# Patient Record
Sex: FEMALE | Race: WHITE | NOT HISPANIC OR LATINO | Employment: OTHER | URBAN - METROPOLITAN AREA
[De-identification: names, ages, dates, MRNs, and addresses within clinical notes are randomized per-mention and may not be internally consistent; named-entity substitution may affect disease eponyms.]

---

## 2023-11-09 PROCEDURE — 99213 OFFICE O/P EST LOW 20 MIN: CPT | Performed by: INTERNAL MEDICINE

## 2023-11-15 DIAGNOSIS — I65.23 CAROTID STENOSIS, BILATERAL: ICD-10-CM

## 2023-11-15 PROBLEM — R53.83 FATIGUE: Status: ACTIVE | Noted: 2023-11-15

## 2023-11-15 PROBLEM — R79.89 ABNORMAL THYROID BLOOD TEST: Status: ACTIVE | Noted: 2023-11-15

## 2023-11-15 PROBLEM — R25.1 TREMOR: Status: ACTIVE | Noted: 2023-11-15

## 2023-11-15 PROBLEM — R26.89 BALANCE PROBLEM: Status: ACTIVE | Noted: 2023-11-15

## 2023-11-15 PROBLEM — E53.8 VITAMIN B12 DEFICIENCY: Status: ACTIVE | Noted: 2023-11-15

## 2023-11-15 PROBLEM — R73.9 HYPERGLYCEMIA: Status: ACTIVE | Noted: 2023-11-15

## 2023-11-15 PROBLEM — R49.0 HOARSENESS, CHRONIC: Status: ACTIVE | Noted: 2023-11-15

## 2023-11-15 PROBLEM — I65.8 OCCLUSION AND STENOSIS OF OTHER PRECEREBRAL ARTERIES: Status: ACTIVE | Noted: 2023-11-15

## 2023-11-15 PROBLEM — M62.81 MUSCLE WEAKNESS: Status: ACTIVE | Noted: 2023-11-15

## 2023-11-15 PROBLEM — I63.9 CVA (CEREBRAL VASCULAR ACCIDENT) (MULTI): Status: ACTIVE | Noted: 2023-11-15

## 2023-11-15 PROBLEM — I10 HYPERTENSION: Status: ACTIVE | Noted: 2023-11-15

## 2023-11-15 PROBLEM — I26.99 PULMONARY EMBOLISM (MULTI): Status: ACTIVE | Noted: 2023-11-15

## 2023-11-15 PROBLEM — E55.9 VITAMIN D DEFICIENCY: Status: ACTIVE | Noted: 2023-11-15

## 2023-11-15 PROBLEM — I81 PORTAL VEIN THROMBOSIS: Status: ACTIVE | Noted: 2023-11-15

## 2023-11-15 PROBLEM — R20.0 NUMBNESS OF RIGHT HAND: Status: ACTIVE | Noted: 2023-11-15

## 2023-11-15 RX ORDER — LOSARTAN POTASSIUM 25 MG/1
1 TABLET ORAL DAILY
COMMUNITY
Start: 2021-12-10 | End: 2024-01-26

## 2023-11-15 RX ORDER — ASPIRIN 81 MG/1
1 TABLET ORAL DAILY
COMMUNITY

## 2023-11-15 RX ORDER — ATORVASTATIN CALCIUM 10 MG/1
1 TABLET, FILM COATED ORAL NIGHTLY
COMMUNITY
Start: 2022-10-04 | End: 2023-11-15 | Stop reason: SDUPTHER

## 2023-11-15 RX ORDER — ATORVASTATIN CALCIUM 10 MG/1
10 TABLET, FILM COATED ORAL NIGHTLY
Qty: 90 TABLET | Refills: 3 | Status: SHIPPED | OUTPATIENT
Start: 2023-11-15 | End: 2023-12-12 | Stop reason: SDUPTHER

## 2023-11-15 RX ORDER — AMLODIPINE BESYLATE 2.5 MG/1
1 TABLET ORAL DAILY
COMMUNITY
Start: 2022-11-18 | End: 2024-02-12 | Stop reason: WASHOUT

## 2023-11-15 RX ORDER — MULTIVIT-MIN/FA/LYCOPEN/LUTEIN .4-300-25
1 TABLET ORAL DAILY
COMMUNITY

## 2023-11-25 DIAGNOSIS — I26.99 OTHER PULMONARY EMBOLISM WITHOUT ACUTE COR PULMONALE, UNSPECIFIED CHRONICITY (MULTI): Primary | ICD-10-CM

## 2023-11-27 RX ORDER — RIVAROXABAN 20 MG/1
20 TABLET, FILM COATED ORAL DAILY
Qty: 100 TABLET | Refills: 0 | Status: SHIPPED | OUTPATIENT
Start: 2023-11-27 | End: 2024-02-06

## 2023-12-12 DIAGNOSIS — I65.23 CAROTID STENOSIS, BILATERAL: ICD-10-CM

## 2023-12-12 RX ORDER — ATORVASTATIN CALCIUM 10 MG/1
10 TABLET, FILM COATED ORAL NIGHTLY
Qty: 90 TABLET | Refills: 3 | Status: SHIPPED | OUTPATIENT
Start: 2023-12-12 | End: 2023-12-18 | Stop reason: SDUPTHER

## 2023-12-13 ENCOUNTER — APPOINTMENT (OUTPATIENT)
Dept: PRIMARY CARE | Facility: CLINIC | Age: 88
End: 2023-12-13
Payer: COMMERCIAL

## 2023-12-15 ENCOUNTER — OFFICE VISIT (OUTPATIENT)
Dept: PRIMARY CARE | Facility: CLINIC | Age: 88
End: 2023-12-15
Payer: COMMERCIAL

## 2023-12-15 VITALS
HEIGHT: 67 IN | DIASTOLIC BLOOD PRESSURE: 78 MMHG | BODY MASS INDEX: 29.35 KG/M2 | SYSTOLIC BLOOD PRESSURE: 110 MMHG | OXYGEN SATURATION: 96 % | TEMPERATURE: 98 F | WEIGHT: 187 LBS | HEART RATE: 84 BPM

## 2023-12-15 DIAGNOSIS — I10 HYPERTENSION, UNSPECIFIED TYPE: ICD-10-CM

## 2023-12-15 DIAGNOSIS — R73.9 HYPERGLYCEMIA: ICD-10-CM

## 2023-12-15 DIAGNOSIS — E55.9 VITAMIN D DEFICIENCY: Primary | ICD-10-CM

## 2023-12-15 DIAGNOSIS — E61.1 LOW IRON: ICD-10-CM

## 2023-12-15 PROBLEM — R49.0 HOARSENESS, CHRONIC: Status: RESOLVED | Noted: 2023-11-15 | Resolved: 2023-12-15

## 2023-12-15 PROBLEM — I65.23 CAROTID STENOSIS, ASYMPTOMATIC, BILATERAL: Status: ACTIVE | Noted: 2023-12-15

## 2023-12-15 PROCEDURE — 1036F TOBACCO NON-USER: CPT | Performed by: INTERNAL MEDICINE

## 2023-12-15 PROCEDURE — 1126F AMNT PAIN NOTED NONE PRSNT: CPT | Performed by: INTERNAL MEDICINE

## 2023-12-15 PROCEDURE — 99214 OFFICE O/P EST MOD 30 MIN: CPT | Performed by: INTERNAL MEDICINE

## 2023-12-15 PROCEDURE — 1159F MED LIST DOCD IN RCRD: CPT | Performed by: INTERNAL MEDICINE

## 2023-12-15 PROCEDURE — 3078F DIAST BP <80 MM HG: CPT | Performed by: INTERNAL MEDICINE

## 2023-12-15 PROCEDURE — 3074F SYST BP LT 130 MM HG: CPT | Performed by: INTERNAL MEDICINE

## 2023-12-15 RX ORDER — NYSTATIN 100000 U/G
CREAM TOPICAL
COMMUNITY

## 2023-12-15 RX ORDER — LANOLIN ALCOHOL/MO/W.PET/CERES
1 CREAM (GRAM) TOPICAL DAILY
COMMUNITY

## 2023-12-15 ASSESSMENT — PAIN SCALES - GENERAL: PAINLEVEL: 0-NO PAIN

## 2023-12-15 ASSESSMENT — PATIENT HEALTH QUESTIONNAIRE - PHQ9
2. FEELING DOWN, DEPRESSED OR HOPELESS: NOT AT ALL
SUM OF ALL RESPONSES TO PHQ9 QUESTIONS 1 & 2: 0
1. LITTLE INTEREST OR PLEASURE IN DOING THINGS: NOT AT ALL

## 2023-12-15 NOTE — PROGRESS NOTES
"Chief Complaint   Patient presents with    Med Refill     Pt here for medication refills.     Last 6/2023. Fatigue and sob.  Friend present.   Would like to decrease visits to Dr Gonzalez.   No chest pain/pressure, sob  Fatigue, has to rest when walking in the store. Does not to wear oxygen. Told to use prn per patient.  Uses can of oxygen that she sprays prn sob. This helps    PAST MEDICAL HISTORY:   1. Pulmonary embolism, on Xarelto since December 2017, Dr. Gonzalez.  2. Portal vein thrombosis, Xarelto.  3. Pancreatitis, likely reaction to portal vein thrombosis.  4. Hypertension.  5. Right ovarian cyst.   6. Shortness of breath, hypoxia. PFTs Dr. Chapman. Oxygen 2 L when necessary  7. Carotid stenosis 8/2022 US 50-69%  8. Hyperglycemia  9. CVA no residual    SOCIAL HISTORY: . Ex- passed away. She has 5  children. They are healthy. She is a nonsmoker.    FAMILY HISTORY: Sister and father had cancer, but type unknown  sister with blood condition, but unsure what it was.     Blood pressure 110/78, pulse 84, temperature 36.7 °C (98 °F), height 1.702 m (5' 7\"), weight 84.8 kg (187 lb), SpO2 96 %.  Body mass index is 29.29 kg/m².    Vital reviewed  Neck: no cervical/clavicular adenopathy  CV: RRR S1 S2 normal. No murmur  Lungs: CTA without wrr. Breath sounds symmetric  Abdomen: normoactive. Soft, nontender. No mass.  Extremities: no pretibial edema  Neuro: speech intact.     Labs 11/2023 cmp, cbc  Cr 1.2 albumin low 3.2 glucose 113  Hg 11.9    A/p  HTNcontrolled  Hyperlipdemia continue atorvastatin  Fatigue D with next labs. Previously checked d, b12, thyroid studies  Hx portal vein thrombosis. Reviewed hem/onc note, continue xarelto lifelong. She requested to stop visits but we discussed changing to yearly  CKD stable.   Labs 6 months and prn.          "

## 2023-12-18 DIAGNOSIS — I65.23 CAROTID STENOSIS, BILATERAL: ICD-10-CM

## 2023-12-18 RX ORDER — ATORVASTATIN CALCIUM 10 MG/1
10 TABLET, FILM COATED ORAL NIGHTLY
Qty: 90 TABLET | Refills: 1 | Status: SHIPPED | OUTPATIENT
Start: 2023-12-18 | End: 2024-03-25 | Stop reason: SDUPTHER

## 2024-01-24 DIAGNOSIS — I10 HYPERTENSION, UNSPECIFIED TYPE: ICD-10-CM

## 2024-01-26 RX ORDER — LOSARTAN POTASSIUM 25 MG/1
25 TABLET ORAL DAILY
Qty: 100 TABLET | Refills: 2 | Status: SHIPPED | OUTPATIENT
Start: 2024-01-26

## 2024-02-03 DIAGNOSIS — I26.99 OTHER PULMONARY EMBOLISM WITHOUT ACUTE COR PULMONALE, UNSPECIFIED CHRONICITY (MULTI): ICD-10-CM

## 2024-02-06 RX ORDER — RIVAROXABAN 20 MG/1
20 TABLET, FILM COATED ORAL DAILY
Qty: 100 TABLET | Refills: 1 | Status: SHIPPED | OUTPATIENT
Start: 2024-02-06

## 2024-02-12 ENCOUNTER — OFFICE VISIT (OUTPATIENT)
Dept: PRIMARY CARE | Facility: CLINIC | Age: 89
End: 2024-02-12
Payer: COMMERCIAL

## 2024-02-12 VITALS
DIASTOLIC BLOOD PRESSURE: 80 MMHG | OXYGEN SATURATION: 92 % | SYSTOLIC BLOOD PRESSURE: 138 MMHG | TEMPERATURE: 97.6 F | HEIGHT: 67 IN | WEIGHT: 190.6 LBS | HEART RATE: 68 BPM | BODY MASS INDEX: 29.91 KG/M2

## 2024-02-12 DIAGNOSIS — N93.9 ABNORMAL VAGINAL BLEEDING: ICD-10-CM

## 2024-02-12 DIAGNOSIS — I26.99 PULMONARY EMBOLISM, UNSPECIFIED CHRONICITY, UNSPECIFIED PULMONARY EMBOLISM TYPE, UNSPECIFIED WHETHER ACUTE COR PULMONALE PRESENT (MULTI): ICD-10-CM

## 2024-02-12 DIAGNOSIS — M54.40 ACUTE RIGHT-SIDED LOW BACK PAIN WITH SCIATICA, SCIATICA LATERALITY UNSPECIFIED: ICD-10-CM

## 2024-02-12 DIAGNOSIS — N83.209 CYST OF OVARY, UNSPECIFIED LATERALITY: Primary | ICD-10-CM

## 2024-02-12 PROCEDURE — 99215 OFFICE O/P EST HI 40 MIN: CPT | Performed by: INTERNAL MEDICINE

## 2024-02-12 PROCEDURE — 3079F DIAST BP 80-89 MM HG: CPT | Performed by: INTERNAL MEDICINE

## 2024-02-12 PROCEDURE — 1126F AMNT PAIN NOTED NONE PRSNT: CPT | Performed by: INTERNAL MEDICINE

## 2024-02-12 PROCEDURE — 1159F MED LIST DOCD IN RCRD: CPT | Performed by: INTERNAL MEDICINE

## 2024-02-12 PROCEDURE — 1036F TOBACCO NON-USER: CPT | Performed by: INTERNAL MEDICINE

## 2024-02-12 PROCEDURE — 3075F SYST BP GE 130 - 139MM HG: CPT | Performed by: INTERNAL MEDICINE

## 2024-02-12 RX ORDER — OXYCODONE HYDROCHLORIDE AND ACETAMINOPHEN 5; 325 MG/1; MG/1
1 TABLET ORAL EVERY 6 HOURS PRN
COMMUNITY
Start: 2024-02-09 | End: 2024-02-12

## 2024-02-12 RX ORDER — OXYCODONE AND ACETAMINOPHEN 5; 325 MG/1; MG/1
1 TABLET ORAL EVERY 6 HOURS PRN
Qty: 12 TABLET | Refills: 0 | Status: SHIPPED | OUTPATIENT
Start: 2024-02-12 | End: 2024-04-12 | Stop reason: ALTCHOICE

## 2024-02-12 RX ORDER — PREDNISONE 10 MG/1
TABLET ORAL
COMMUNITY
Start: 2024-02-09 | End: 2024-02-21

## 2024-02-12 RX ORDER — AMLODIPINE BESYLATE 5 MG/1
5 TABLET ORAL DAILY
COMMUNITY

## 2024-02-12 ASSESSMENT — PATIENT HEALTH QUESTIONNAIRE - PHQ9
1. LITTLE INTEREST OR PLEASURE IN DOING THINGS: NOT AT ALL
2. FEELING DOWN, DEPRESSED OR HOPELESS: NOT AT ALL
SUM OF ALL RESPONSES TO PHQ9 QUESTIONS 1 & 2: 0

## 2024-02-12 ASSESSMENT — PAIN SCALES - GENERAL: PAINLEVEL: 0-NO PAIN

## 2024-02-12 NOTE — PROGRESS NOTES
"Standard Progress Note  Chief Complaint   Patient presents with    Hospital Follow-up     Pt here for FUV for [facility] [place] [place] ER on 2/9/24.     Accompanied by friend  Right sided back pain  Has trouble lifting up the leg. Has to use a walker now.    Trouble with balance.  During  Er rx prednisone 10 mg and oxycodone 5-325 mg.  Fell 4 times this year.  2 weeks ago-ankle felt off and she fell  Another time was putting dishes in  and developed pain  No fever or chills.   Treated at . Per paperwork had ovarian cyst-do not have copy of CT report at this time so do no thave details  She did have vaginal bleeding after her last visit 12/17/23. It lasted 3 days.       HPI:  Cynthia Allredambika 89 y.o.   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 Ex- passed away. She has 5    children. They are healthy. She is a nonsmoker.     Blood pressure 138/80, pulse 68, temperature 36.4 °C (97.6 °F), height 1.702 m (5' 7\"), weight 86.5 kg (190 lb 9.6 oz), SpO2 92 %.  Body mass index is 29.85 kg/m².  walker  Vital reviewed  Neck: no cervical/clavicular adenopathy  CV: RRR S1 S2 normal. No murmur. No carotid bruit.   Lungs: CTA without wrr. Breath sounds symmetric  Extremities: no pretibial edema  Neuro: speech intact.     Labs 11/2023 cmp, cbc  Cr 1.2 albumin low 3.2 glucose 113  Hg 11.9    1. Cyst of ovary, unspecified laterality  - Referral to Gynecology; Future  - US pelvis; Future    2. Abnormal vaginal bleeding  - Referral to Gynecology; " Future    3. Acute right-sided low back pain with sciatica, sciatica laterality unspecified  Reviewed oars report  Obtain copy of ct done at -task to staff  Use percocet sparingly as can only write one short term rx. Referral to pain clinic  - oxyCODONE-acetaminophen (Percocet) 5-325 mg tablet; Take 1 tablet by mouth every 6 hours if needed for severe pain (7 - 10).  Dispense: 12 tablet; Refill: 0  - Referral to Pain Medicine; Future    4. Pulmonary embolism, unspecified chronicity, unspecified pulmonary embolism type, unspecified whether acute cor pulmonale present (CMS/[facility])  Remote history 2017. Previously evaluated by hem/onc      Current Outpatient Medications on File Prior to Visit   Medication Sig Dispense Refill    amLODIPine (Norvasc) 5 mg tablet Take 1 tablet (5 mg) by mouth once daily.      aspirin 81 mg EC tablet Take 1 tablet (81 mg) by mouth once daily.      atorvastatin (Lipitor) 10 mg tablet Take 1 tablet (10 mg) by mouth once daily at bedtime. 90 tablet 1    cyanocobalamin (Vitamin B-12) 1,000 mcg tablet Take 1 tablet (1,000 mcg) by mouth once daily.      losartan (Cozaar) 25 mg tablet TAKE 1 TABLET BY MOUTH DAILY 100 tablet 2    multivitamin with minerals iron-free (Centrum Silver) Take 1 tablet by mouth once daily.      nystatin (Mycostatin) cream Apply topically. APPLY SPARINGLY TO AFFECTED AREA(S) 3 TIMES A DAY      Percocet 5-325 mg tablet Take 1 tablet by mouth every 6 hours if needed.      predniSONE 10 mg tablets,dose pack Take 4 tabs daily for 3 days, then 3 tabs daily for 3 days, then 2 tabs daily for 3 days, then 1 tab daily for 3 days.,      rivaroxaban (Xarelto) 20 mg tablet TAKE 1 TABLET BY MOUTH DAILY 100 tablet 1    [DISCONTINUED] amLODIPine (Norvasc) 2.5 mg tablet Take 1 tablet (2.5 mg) by mouth once daily.      [DISCONTINUED] rivaroxaban (Xarelto) 20 mg tablet TAKE 1 TABLET BY MOUTH DAILY 100 tablet 0     No current facility-administered medications on file prior to visit.          Khadijah Suero MD

## 2024-02-14 ENCOUNTER — TELEPHONE (OUTPATIENT)
Dept: PRIMARY CARE | Facility: CLINIC | Age: 89
End: 2024-02-14
Payer: COMMERCIAL

## 2024-02-14 NOTE — TELEPHONE ENCOUNTER
----- Message from Khadijah Reddy MD sent at 2/12/2024  3:20 PM EST -----  Please call SW -need results of ct lumbar spine.

## 2024-02-15 ENCOUNTER — TELEPHONE (OUTPATIENT)
Dept: PRIMARY CARE | Facility: CLINIC | Age: 89
End: 2024-02-15
Payer: COMMERCIAL

## 2024-02-15 NOTE — TELEPHONE ENCOUNTER
Called pt and informed.  States she has an appt with gynecologist on 2/21/24, and the US is scheduled for 2/22/24

## 2024-02-15 NOTE — TELEPHONE ENCOUNTER
----- Message from Khadijah Reddy MD sent at 2/14/2024  5:30 PM EST -----  Please let patient know received copy of CT results from the hospital. Will plan on pelvic ultrasound and having her see a gynecologist as discussed at her visit. Please see if pelvic ultrasound has been scheduled yet.

## 2024-02-19 PROBLEM — N95.0 POST-MENOPAUSAL BLEEDING: Status: ACTIVE | Noted: 2024-02-19

## 2024-02-19 PROBLEM — N83.201 RIGHT OVARIAN CYST: Status: ACTIVE | Noted: 2024-02-19

## 2024-02-19 NOTE — PROGRESS NOTES
"Subjective   Patient ID: Cynthia Bowman is a 89 y.o. female who presents for No chief complaint on file..  Bleeding from -.  Not a great deal of bleeding.  The cyst finding was \"accidental\" for back pain.   Feeling very weak in her right leg due to sciatica. This had been going on for two weeks. Took herself off the medication (percocet).  Taking tylenol.   x 5.   No history of abn paps, but does not think she had any.  No partner now.      Review of Systems   Constitutional:  Positive for activity change. Negative for appetite change, fatigue, fever and unexpected weight change.   Genitourinary:  Positive for vaginal bleeding.       Objective   Physical Exam  Constitutional:       Appearance: Normal appearance. She is normal weight.   Genitourinary:     General: Normal vulva.      Vagina: Normal.      Cervix: Normal.      Uterus: Normal.           Comments: Urethral caruncle.  No palpable masses.  Neurological:      General: No focal deficit present.      Mental Status: She is alert.   Psychiatric:         Mood and Affect: Mood normal.         Behavior: Behavior normal.         Thought Content: Thought content normal.     Patient ID: Cynthia Bowman is a 89 y.o. female.    Endometrial biopsy    Date/Time: 2024 5:16 PM    Performed by: Tanika Oswald MD  Authorized by: Tanika Oswald MD    Consent:     Consent obtained: written    Consent given by: patient    Risks discussed: bleeding and pain    Alternatives discussed: observation    Patient agrees, verbalizes understanding, and wants to proceed: yes      Procedure explained and questions answered to patient or proxy's satisfaction: yes    Indications:     Indications: postmenopausal bleeding      Chronicity of post-menopausal bleeding: new    Progression of post-menopausal bleeding: resolved  Pre-procedure:     Urine pregnancy test: N/A    Procedure:     A bimanual exam was performed: yes      Uterus size: 6-8 weeks    Uterus position: " midposition    Prepped with: Betadine    Tenaculum used: yes      A local block was performed: no      Cervix dilated: no    Findings:     Cervix: stenotic      Patient tolerance: tolerated with difficulty  Comments:     Procedure comments: Unable to perform      Assessment/Plan   Problem List Items Addressed This Visit             ICD-10-CM       Genitourinary and Reproductive    Post-menopausal bleeding - Primary N95.0    Right ovarian cyst N83.201            Tanika Oswlad MD 02/19/24 1:05 PM

## 2024-02-20 NOTE — TELEPHONE ENCOUNTER
Left pt detailed message requesting name of gynecologist and provided office fax number for copy of visit note.

## 2024-02-21 ENCOUNTER — OFFICE VISIT (OUTPATIENT)
Dept: OBSTETRICS AND GYNECOLOGY | Facility: CLINIC | Age: 89
End: 2024-02-21
Payer: COMMERCIAL

## 2024-02-21 VITALS
DIASTOLIC BLOOD PRESSURE: 62 MMHG | BODY MASS INDEX: 29.82 KG/M2 | HEIGHT: 67 IN | WEIGHT: 190 LBS | SYSTOLIC BLOOD PRESSURE: 114 MMHG

## 2024-02-21 DIAGNOSIS — N83.209 CYST OF OVARY, UNSPECIFIED LATERALITY: ICD-10-CM

## 2024-02-21 DIAGNOSIS — Z12.4 CERVICAL CANCER SCREENING: ICD-10-CM

## 2024-02-21 DIAGNOSIS — N95.0 POST-MENOPAUSAL BLEEDING: Primary | ICD-10-CM

## 2024-02-21 DIAGNOSIS — N83.201 RIGHT OVARIAN CYST: ICD-10-CM

## 2024-02-21 DIAGNOSIS — N93.9 ABNORMAL VAGINAL BLEEDING: ICD-10-CM

## 2024-02-21 PROBLEM — N94.89 ADNEXAL MASS: Status: ACTIVE | Noted: 2024-02-21

## 2024-02-21 PROCEDURE — 1159F MED LIST DOCD IN RCRD: CPT | Performed by: OBSTETRICS & GYNECOLOGY

## 2024-02-21 PROCEDURE — 88175 CYTOPATH C/V AUTO FLUID REDO: CPT

## 2024-02-21 PROCEDURE — 58100 BIOPSY OF UTERUS LINING: CPT | Performed by: OBSTETRICS & GYNECOLOGY

## 2024-02-21 PROCEDURE — 99203 OFFICE O/P NEW LOW 30 MIN: CPT | Performed by: OBSTETRICS & GYNECOLOGY

## 2024-02-21 PROCEDURE — 1036F TOBACCO NON-USER: CPT | Performed by: OBSTETRICS & GYNECOLOGY

## 2024-02-21 PROCEDURE — 3074F SYST BP LT 130 MM HG: CPT | Performed by: OBSTETRICS & GYNECOLOGY

## 2024-02-21 PROCEDURE — 1126F AMNT PAIN NOTED NONE PRSNT: CPT | Performed by: OBSTETRICS & GYNECOLOGY

## 2024-02-21 PROCEDURE — 3078F DIAST BP <80 MM HG: CPT | Performed by: OBSTETRICS & GYNECOLOGY

## 2024-02-21 PROCEDURE — 87624 HPV HI-RISK TYP POOLED RSLT: CPT

## 2024-02-21 ASSESSMENT — ENCOUNTER SYMPTOMS
APPETITE CHANGE: 0
FEVER: 0
FATIGUE: 0
UNEXPECTED WEIGHT CHANGE: 0
ACTIVITY CHANGE: 1

## 2024-02-21 NOTE — ASSESSMENT & PLAN NOTE
Attempt at endometrial biopsy unsuccessful due to patient's discomfort and some cervical stenosis.  Will update after ultrasound.

## 2024-02-21 NOTE — PROGRESS NOTES
Patient ID: Cynthia Bowman is a 89 y.o. female.    Endometrial biopsy    Date/Time: 2/21/2024 10:59 AM    Performed by: Tanika Oswald MD  Authorized by: Tanika Oswald MD    Consent:     Consent obtained: written    Consent given by: patient  Universal protocol:     Test results available and properly labeled: yes      Relevant documents present and verified: yes      Imaging studies available: yes      Required blood products, implants, devices, and special equipment available: yes      Site/side marked: yes      Immediately prior to procedure a time out was called: yes      Patient identity confirmed: verbally with patient

## 2024-02-22 ENCOUNTER — HOSPITAL ENCOUNTER (OUTPATIENT)
Dept: RADIOLOGY | Facility: CLINIC | Age: 89
Discharge: HOME | End: 2024-02-22
Payer: COMMERCIAL

## 2024-02-22 DIAGNOSIS — N83.209 CYST OF OVARY, UNSPECIFIED LATERALITY: ICD-10-CM

## 2024-02-22 PROCEDURE — 76857 US EXAM PELVIC LIMITED: CPT | Performed by: RADIOLOGY

## 2024-02-22 PROCEDURE — 76830 TRANSVAGINAL US NON-OB: CPT | Performed by: RADIOLOGY

## 2024-02-22 PROCEDURE — 76856 US EXAM PELVIC COMPLETE: CPT

## 2024-03-01 ENCOUNTER — TELEPHONE (OUTPATIENT)
Dept: PRIMARY CARE | Facility: CLINIC | Age: 89
End: 2024-03-01
Payer: COMMERCIAL

## 2024-03-01 NOTE — TELEPHONE ENCOUNTER
----- Message from Khadijah Reddy MD sent at 2/26/2024  1:51 PM EST -----  Received message does not think ovarian cyst is cancerous. As a precaution, can repeat pelvic ultrasound in 3 months. If patient is ok with this route back and will order pelvic ultrasound for end of May.

## 2024-03-01 NOTE — TELEPHONE ENCOUNTER
Pt returned call and informed, voiced understanding.  Pt is agreeable to pelvic ultrasound in 3 months

## 2024-03-01 NOTE — TELEPHONE ENCOUNTER
----- Message from Khadijah Reddy MD sent at 2/26/2024  5:41 PM EST -----  There is another message regarding this. Patient saw ob/gyn already for this. Gyn thinks this is benign. Could do ultrasound in 3 months.

## 2024-03-04 ENCOUNTER — OFFICE VISIT (OUTPATIENT)
Dept: PAIN MEDICINE | Facility: CLINIC | Age: 89
End: 2024-03-04
Payer: COMMERCIAL

## 2024-03-04 VITALS
WEIGHT: 190 LBS | TEMPERATURE: 97.3 F | RESPIRATION RATE: 15 BRPM | HEIGHT: 67 IN | SYSTOLIC BLOOD PRESSURE: 127 MMHG | HEART RATE: 93 BPM | BODY MASS INDEX: 29.82 KG/M2 | DIASTOLIC BLOOD PRESSURE: 75 MMHG

## 2024-03-04 DIAGNOSIS — M54.40 ACUTE RIGHT-SIDED LOW BACK PAIN WITH SCIATICA, SCIATICA LATERALITY UNSPECIFIED: ICD-10-CM

## 2024-03-04 DIAGNOSIS — M54.16 LUMBAR NEURITIS: Primary | ICD-10-CM

## 2024-03-04 PROCEDURE — 99214 OFFICE O/P EST MOD 30 MIN: CPT | Performed by: ANESTHESIOLOGY

## 2024-03-04 ASSESSMENT — ENCOUNTER SYMPTOMS
LOSS OF SENSATION IN FEET: 1
OCCASIONAL FEELINGS OF UNSTEADINESS: 1
DEPRESSION: 0

## 2024-03-04 ASSESSMENT — PATIENT HEALTH QUESTIONNAIRE - PHQ9
2. FEELING DOWN, DEPRESSED OR HOPELESS: NOT AT ALL
1. LITTLE INTEREST OR PLEASURE IN DOING THINGS: NOT AT ALL
SUM OF ALL RESPONSES TO PHQ9 QUESTIONS 1 AND 2: 0

## 2024-03-04 ASSESSMENT — COLUMBIA-SUICIDE SEVERITY RATING SCALE - C-SSRS
2. HAVE YOU ACTUALLY HAD ANY THOUGHTS OF KILLING YOURSELF?: NO
6. HAVE YOU EVER DONE ANYTHING, STARTED TO DO ANYTHING, OR PREPARED TO DO ANYTHING TO END YOUR LIFE?: NO
1. IN THE PAST MONTH, HAVE YOU WISHED YOU WERE DEAD OR WISHED YOU COULD GO TO SLEEP AND NOT WAKE UP?: NO

## 2024-03-04 ASSESSMENT — PAIN SCALES - GENERAL: PAINLEVEL: 8

## 2024-03-04 NOTE — PROGRESS NOTES
History Of Present Illness  Cynthia Bowman is a 89 y.o. female presenting with   Chief Complaint   Patient presents with    Back Pain       Patient presents with complaints of chronic low back pain to the RLE.  The pain is constant, worse with activity and better with nothing. The pain is irritating sharp, stabbing and shooting to the RLE. Denies LE paresthesias, weakness, saddle anesthesia, bowel or bladder incontinence. To manage this pain the patient has attempted Percocet via the ED but it made her drowsy. The patients chronic hx of DVT and Pancreas and was started on XARELTO, HTN,  are stable on medication management.     PAIN SCORE: 8/10.    PCP: Dr. Reddy ()       Past Medical History  She has a past medical history of elevated lipids, Hypertension, and Pulmonary emboli (CMS/HCC).    Surgical History  She has a past surgical history that includes Other surgical history (12/08/2021).     Social History  She reports that she has never smoked. She has never used smokeless tobacco. She reports that she does not drink alcohol and does not use drugs.    Denies any tobacco usage.     Family History  Family History   Problem Relation Name Age of Onset    Cancer Father          type unknown    Cancer Sister          type unknown        Allergies  Patient has no known allergies.    Review of Systems    All other systems reviewed and negative for any deficits. Pertinent positives and negatives were considered in the medical decision making process.        Physical Exam  /75   Pulse 93   Temp 36.3 °C (97.3 °F)   Resp 15   Wt 86.2 kg (190 lb)     General: Pt appears stated age    Eyes: Conjunctiva non-icteric and lids without obvious rash or drooping. Pupils are symmetric    ENT: External ears and nose appear to be without deformity or rash. No lesions or masses noted. Hearing is grossly intact    Neck: No JVD noted, tracheal position midline. No goiter noted on assessment of thyroid    Respiratory: No  gasping or shortness of breath noted, no use of accessory muscles noted    Cardiovascular: Extremities show no edema or varicosities    Skin: No rashes or open lesions/ulcers identified on skin. No induration/tightening noted with palpation of skin    Musculoskeletal: Gait is grossly normal    Digits/nails show no clubbing or cyanosis    Exam of muscles/joints/bones shows no gross atrophy and no abnormal/involuntary movements in the head/neckNo asymmetry or masses noted in the head/neck    Stability: no subluxation noted on movement of bilateral upper extremities or head/neck    Strength: 4/5 in RLE and 5/5 LLE     Range of Motion: WNL     Neurologic: Reflexes: 2+     Sensation: WNL    Cranial nerves 2-12 are grossly intact    Psychiatric: Pt is alert and oriented to time, place and person.         Assessment/Plan   1. Lumbar neuritis  Referral to Physical Therapy      2. Acute right-sided low back pain with sciatica, sciatica laterality unspecified  Referral to Pain Medicine    Referral to Physical Therapy           1. I have provided the patient with a list of physical therapy exercises to learn and perform to strengthen core, maintain stabilization, and reduce pain. We reviewed the exercises in detail and I encouraged them to perform them on a regular basis.    I have referred the patient to physical therapy/aqua therapy to learn and perform exercises to strengthen core/extremties, maintain stabilization, increase range of motion, and reduce pain.    2. I would recommend the pt start on Gabapentin to help with nerve related pain. We discussed the risks, benefits, and side effects to this medication including the mechanism of action and the pt understands and will hold off for now.     3.I extensively reviewed the patients CT scan ( ED) findings in detail, including review of the actual images and provided a detailed explanation of the findings using a spine model.     4.  The patient is a candidate for an LESI  L5/S1 to treat low back / neck and radicular pain. I spent time with the patient discussing all of the risks, benefits, and alternatives to this measure. Including but not limited to worsening pain with injection, no improvement with pain, numbness in the lower extremity, vagal reaction, hypotension, feeling lightheaded/dizzy, spinal infection, epidural hematoma/abscess, paralysis, nerve injury, steroid effects, and spinal headache.The patient understands ALL of these risks and agrees to proceed with the planned procedure.    We will contact the patients PCP to determine if it is safe to stop ASA 7 DAYS AND XARELTO for 5 days prior to procedure. They will also need to have their INR checked the day of the procedure. We did discuss the risks for stopping anticoagulation including, but not limited to any cardiovascular event, embolism, and even death. The patient understands these risks and would like to proceed with the procedure.    Return after you complete therapy or sooner if condition changes.     Rufus Sotelo MD    I spent time with the patient reviewing their imaging and discussing the risks benefits and alternatives to the above plan. A total of 45 minutes was spent reviewing the data and greater than 50% of that time was with the patient during the face to face encounter discussing treatment options both surgical, non-surgical, and minimally invasive techniques.

## 2024-03-05 LAB
CYTOLOGY CMNT CVX/VAG CYTO-IMP: NORMAL
HPV HR 12 DNA GENITAL QL NAA+PROBE: NEGATIVE
HPV HR GENOTYPES PNL CVX NAA+PROBE: NEGATIVE
HPV16 DNA SPEC QL NAA+PROBE: NEGATIVE
HPV18 DNA SPEC QL NAA+PROBE: NEGATIVE
LAB AP HPV GENOTYPE QUESTION: YES
LAB AP HPV HR: NORMAL
LABORATORY COMMENT REPORT: NORMAL
PATH REPORT.TOTAL CANCER: NORMAL

## 2024-03-22 ENCOUNTER — APPOINTMENT (OUTPATIENT)
Dept: PHYSICAL THERAPY | Facility: CLINIC | Age: 89
End: 2024-03-22
Payer: COMMERCIAL

## 2024-03-25 DIAGNOSIS — I65.23 CAROTID STENOSIS, BILATERAL: ICD-10-CM

## 2024-03-25 RX ORDER — ATORVASTATIN CALCIUM 10 MG/1
10 TABLET, FILM COATED ORAL NIGHTLY
Qty: 90 TABLET | Refills: 0 | Status: SHIPPED | OUTPATIENT
Start: 2024-03-25 | End: 2024-05-20

## 2024-03-26 ENCOUNTER — TELEPHONE (OUTPATIENT)
Dept: PAIN MEDICINE | Facility: CLINIC | Age: 89
End: 2024-03-26
Payer: COMMERCIAL

## 2024-03-26 DIAGNOSIS — M54.16 LUMBAR NEURITIS: Primary | ICD-10-CM

## 2024-03-26 NOTE — TELEPHONE ENCOUNTER
Could you have a referral for physical therapy sent to [facility] [phone]  Fax - [phone]

## 2024-04-01 NOTE — TELEPHONE ENCOUNTER
Please print PT order and fax per pt request.      [facility] [phone]  Fax - [phone]

## 2024-04-12 ENCOUNTER — OFFICE VISIT (OUTPATIENT)
Dept: PAIN MEDICINE | Facility: CLINIC | Age: 89
End: 2024-04-12
Payer: COMMERCIAL

## 2024-04-12 VITALS
WEIGHT: 190 LBS | DIASTOLIC BLOOD PRESSURE: 83 MMHG | OXYGEN SATURATION: 97 % | HEART RATE: 84 BPM | BODY MASS INDEX: 29.76 KG/M2 | SYSTOLIC BLOOD PRESSURE: 130 MMHG | RESPIRATION RATE: 15 BRPM | TEMPERATURE: 96.8 F

## 2024-04-12 DIAGNOSIS — M54.16 LUMBAR NEURITIS: Primary | ICD-10-CM

## 2024-04-12 DIAGNOSIS — G89.29 CHRONIC BILATERAL LOW BACK PAIN WITH BILATERAL SCIATICA: ICD-10-CM

## 2024-04-12 DIAGNOSIS — M54.42 CHRONIC BILATERAL LOW BACK PAIN WITH BILATERAL SCIATICA: ICD-10-CM

## 2024-04-12 DIAGNOSIS — M54.41 CHRONIC BILATERAL LOW BACK PAIN WITH BILATERAL SCIATICA: ICD-10-CM

## 2024-04-12 PROCEDURE — 99214 OFFICE O/P EST MOD 30 MIN: CPT | Performed by: ANESTHESIOLOGY

## 2024-04-12 SDOH — ECONOMIC STABILITY: FOOD INSECURITY: WITHIN THE PAST 12 MONTHS, YOU WORRIED THAT YOUR FOOD WOULD RUN OUT BEFORE YOU GOT MONEY TO BUY MORE.: NEVER TRUE

## 2024-04-12 SDOH — ECONOMIC STABILITY: FOOD INSECURITY: WITHIN THE PAST 12 MONTHS, THE FOOD YOU BOUGHT JUST DIDN'T LAST AND YOU DIDN'T HAVE MONEY TO GET MORE.: NEVER TRUE

## 2024-04-12 ASSESSMENT — LIFESTYLE VARIABLES
HOW OFTEN DO YOU HAVE SIX OR MORE DRINKS ON ONE OCCASION: NEVER
HOW OFTEN DO YOU HAVE A DRINK CONTAINING ALCOHOL: NEVER
SKIP TO QUESTIONS 9-10: 1
HOW MANY STANDARD DRINKS CONTAINING ALCOHOL DO YOU HAVE ON A TYPICAL DAY: PATIENT DOES NOT DRINK
AUDIT-C TOTAL SCORE: 0

## 2024-04-12 ASSESSMENT — ENCOUNTER SYMPTOMS
LOSS OF SENSATION IN FEET: 1
DEPRESSION: 0
OCCASIONAL FEELINGS OF UNSTEADINESS: 1

## 2024-04-12 ASSESSMENT — PAIN SCALES - GENERAL: PAINLEVEL: 0-NO PAIN

## 2024-04-12 NOTE — PROGRESS NOTES
History Of Present Illness  Cynthia Bowman is a 89 y.o. female presenting with   Chief Complaint   Patient presents with    Follow-up       Patient returns regarding improved chronic low back pain to the RLE.  She reports therapy has been helpful this far and will be restarting therapy.     The pain is constant, worse with activity and better with nothing. The pain is irritating sharp, stabbing and shooting to the RLE. Denies LE paresthesias, weakness, saddle anesthesia, bowel or bladder incontinence. To manage this pain the patient has attempted Percocet via the ED but it made her drowsy. The patients chronic hx of DVT and Pancreas and was started on XARELTO, HTN,  are stable on medication management.     PAIN SCORE: 0 - 8/10.    PCP: Dr. Rdedy ()       Past Medical History  She has a past medical history of elevated lipids, Hypertension, and Pulmonary emboli (Multi).    Surgical History  She has a past surgical history that includes Other surgical history (12/08/2021).     Social History  She reports that she has never smoked. She has never used smokeless tobacco. She reports that she does not drink alcohol and does not use drugs.    Denies any tobacco usage.     Family History  Family History   Problem Relation Name Age of Onset    Cancer Father          type unknown    Cancer Sister          type unknown        Allergies  Patient has no known allergies.    Review of Systems    All other systems reviewed and negative for any deficits. Pertinent positives and negatives were considered in the medical decision making process.        Physical Exam  /83   Pulse 84   Temp 36 °C (96.8 °F)   Resp 15   Wt 86.2 kg (190 lb)   SpO2 97%     General: Pt appears stated age    Eyes: Conjunctiva non-icteric and lids without obvious rash or drooping. Pupils are symmetric    ENT: External ears and nose appear to be without deformity or rash. No lesions or masses noted. Hearing is grossly intact    Neck: No JVD noted,  tracheal position midline. No goiter noted on assessment of thyroid    Respiratory: No gasping or shortness of breath noted, no use of accessory muscles noted    Cardiovascular: Extremities show no edema or varicosities    Skin: No rashes or open lesions/ulcers identified on skin. No induration/tightening noted with palpation of skin    Musculoskeletal: Gait is grossly normal    Digits/nails show no clubbing or cyanosis    Exam of muscles/joints/bones shows no gross atrophy and no abnormal/involuntary movements in the head/neckNo asymmetry or masses noted in the head/neck    Stability: no subluxation noted on movement of bilateral upper extremities or head/neck    Strength: 4/5 in RLE and 5/5 LLE     Range of Motion: WNL     Neurologic: Reflexes: 2+     Sensation: WNL    Cranial nerves 2-12 are grossly intact    Psychiatric: Pt is alert and oriented to time, place and person.         Assessment/Plan   1. Lumbar neuritis        2. Chronic bilateral low back pain with bilateral sciatica               1. I have provided the patient with a list of physical therapy exercises to learn and perform to strengthen core, maintain stabilization, and reduce pain. We reviewed the exercises in detail and I encouraged them to perform them on a regular basis.    I have referred the patient to physical therapy/aqua therapy to learn and perform exercises to strengthen core/extremties, maintain stabilization, increase range of motion, and reduce pain.    2. I would recommend the pt start on Gabapentin to help with nerve related pain. We discussed the risks, benefits, and side effects to this medication including the mechanism of action and the pt understands and will hold off for now.     3.I extensively reviewed the patients CT scan ( ED) findings in detail, including review of the actual images and provided a detailed explanation of the findings using a spine model.     4.  The patient is a candidate for an LESI L5/S1 to treat low  back / neck and radicular pain. I spent time with the patient discussing all of the risks, benefits, and alternatives to this measure. Including but not limited to worsening pain with injection, no improvement with pain, numbness in the lower extremity, vagal reaction, hypotension, feeling lightheaded/dizzy, spinal infection, epidural hematoma/abscess, paralysis, nerve injury, steroid effects, and spinal headache.The patient understands ALL of these risks and agrees to proceed with the planned procedure.    We will contact the patients PCP to determine if it is safe to stop ASA 7 DAYS AND XARELTO for 5 days prior to procedure. They will also need to have their INR checked the day of the procedure. We did discuss the risks for stopping anticoagulation including, but not limited to any cardiovascular event, embolism, and even death. The patient understands these risks and would like to proceed with the procedure.    Return after you complete therapy or sooner if condition changes.     Rufus Sotelo MD    I spent time with the patient reviewing their imaging and discussing the risks benefits and alternatives to the above plan. A total of 30 minutes was spent reviewing the data and greater than 50% of that time was with the patient during the face to face encounter discussing treatment options both surgical, non-surgical, and minimally invasive techniques.

## 2024-04-15 ENCOUNTER — EVALUATION (OUTPATIENT)
Dept: PHYSICAL THERAPY | Facility: CLINIC | Age: 89
End: 2024-04-15
Payer: COMMERCIAL

## 2024-04-15 DIAGNOSIS — M54.40 ACUTE RIGHT-SIDED LOW BACK PAIN WITH SCIATICA, SCIATICA LATERALITY UNSPECIFIED: ICD-10-CM

## 2024-04-15 DIAGNOSIS — M54.16 LUMBAR NEURITIS: ICD-10-CM

## 2024-04-15 PROCEDURE — 97110 THERAPEUTIC EXERCISES: CPT | Mod: GP | Performed by: INTERNAL MEDICINE

## 2024-04-15 PROCEDURE — 97161 PT EVAL LOW COMPLEX 20 MIN: CPT | Mod: GP | Performed by: INTERNAL MEDICINE

## 2024-04-15 NOTE — PROGRESS NOTES
PHYSICAL THERAPY EVALUATION AND TREATMENT    Patient Name: Cynthia Bowman  MRN: [id number]  Today's Date: 4/15/2024  Time Calculation  Start Time: 1421  Stop Time: 1503  Time Calculation (min): 42 min    Insurance:  Visit number: 1   Insurance Type: Payor: [employer] DUAL COMPLETE / Plan: [employer] DUAL COMPLETE / Product Type: *No Product type* /   Visits are medically necessary; no auth required after eval  Referred by: Rufus Sotelo MD     PT Evaluation Time Entry  PT Evaluation (Low) Time Entry: 34  PT Therapeutic Procedures Time Entry  Therapeutic Exercise Time Entry: 8                     Assessment:  PT Assessment Results: Decreased strength, Decreased range of motion, Decreased endurance, Impaired balance, Decreased mobility  Rehab Prognosis: Good  Evaluation/Treatment Tolerance: Patient tolerated treatment well, Patient limited by fatigue    Cynthia Bowman  is a 89 y.o. old patient who participated in a physical therapy evaluation today due to gait instability and balance deficits. Pt does not currently have LBP or radiating pain to RLE and so evaluation more focused on balance deficits. Her impairments include: balance and postural deficits, strength deficits, ROM deficits, and motor control deficits. Due to these impairments, she has the following functional limitations and participation restrictions: Gait deviations, increased fall risk, difficulty with sleeping, stair negotiation, transfers, performing household/recreational/occupational activities, and performing some ADLS. Skilled physical therapy services are appropriate and beneficial in order to achieve measurable and meaningful change in the above objective tests and measures. Utilization of skilled physical therapy services will aid in advancing her functional status and attaining her therapy-related goals. The patient verbalized understanding and is in agreement with all goals and plan of care.  Plan of care was developed with  input and agreement by the patient    Plan:   Treatment/Interventions: Cryotherapy, Aquatic therapy, Education/ Instruction, Gait training, Manual therapy, Neuromuscular re-education, Self care/ home management, Taping techniques, Therapeutic activities, Therapeutic exercises, Ultrasound  PT Plan: Skilled PT  PT Frequency: 2 times per week  Duration: 6 weeks  Onset Date: 02/01/24  Certification Period Start Date: 04/15/24  Certification Period End Date: 07/14/24  Rehab Potential: Good  Plan of Care Agreement: Patient    NV: monitor compliance with use of rollator to manage fall risk, administer FGA, M-CTSIB and 6MWT, assess response to HEP and progress LE strengthening program as jolynn,       Therapy Diagnosis:   1. Acute right-sided low back pain with sciatica, sciatica laterality unspecified  Referral to Physical Therapy    Follow Up In Physical Therapy      2. Lumbar neuritis  Referral to Physical Therapy    Follow Up In Physical Therapy          Subjective    **Pt accompanied by her friend Cecile and assists pt with providing subjective**  Onset: 02/01/24    MARBIN: Initially pt had back pain bending over to put her dishes away. Pt friend reports pt went to [facility] ED, underwent imaging, and was prescribed Percocet to manage her pain; has since stopped taking this because it made her drowsy. However, pt does not have back pain or radiating pain currently today. Pt and pt friend reports pt has started to fall more since last year and has 5 since then; seem like it is every 3-4 weeks. Pt reports falling in her home right before coming to al today; denies hitting head but did fall on R hip. Pt found pt on floor and assisted pt to stand. States she doesn't have much warning when falls happen but seems to trip over R > L foot.     Pain location: No pain currently. When painful, pt reports R-side of LB and R calf   Pain description: R LB: grabbing, pulling. R calf: cramping and hard  Worse with: fwd bending like when  "doing the dishes  Better with: rest, repositioning, heat    Denies bowel/bladder incontinence    Prior treatments/interventions: none    Home: One-story home with 2-3 KERRI with HR. 9 steps to basement with HR   PLOF/CLOF: Independent with all mobility, ADLs, and iADLs with assistive device SPC in and out of house. Assist for transportation from pt friend Cecile  Functional limitations: standing, walking, stair negotiation, housework  Pt's goal: \"balance\"    Work: retired  Exercise: none  Hobbies:none    Diagnostic images: unable to view [facility] imaging    Medical History Form: Reviewed (scanned into chart)      Precautions:  Fall Risk: Moderate; recommend gait belt and CGA when in clinic  Denies: CA, pacemaker, DM,  latex allergy, epilepsy/seizures, or other known cardio/neuro/pulmonary problems   Denies unexpected weight loss/gain, night sweats, or night pain.    Previous surgeries include: none    Objective   Outcome Measures:  Other Measures  Oswestry Disablity Index (YNES): 14%    5xSTS: 35.99\" pushing up through legs  TU.15\" with cane on L   10MWT (comfortable speed): 0.75 m/s with cane on L   10MWT (fast speed): 0.89 m/s with cane on L     Observation: compensates with L trunk lean during R hip flexion MMT   Gait: MI with SPC in L UE. Demos   Posture: fair, forward head, rounded shoulders  Correction of posture: ne on sx's     Dermatomes/Sensation Testing:  (L2) Anterior Thigh:  intact  (L3) Medial Knee:  intact  (L4) Medial Malleolus:  intact  (L5) Dorsal Second Toe Web Space: intact  (S1) Lateral Malleolus:  intact    Myotomes/Strength Testing (MMT in sitting):  (L2) Hip Flex (R/L):  4-/5 (with trunk compensation), 4-/5  (L3) Knee Ext (R/L): 4-/5 (R quad cramping), 4/5  Knee Flex (R/L): 4-/5, 4/5  (L4) Ankle DF (R/L): 4-/5, 4/5  (L5) GTE (R/L): 4-/5, 4-/5  (S1) Ankle PF (R/L):  5/5, 5/5    Hip Ext (observed during functional STS without UE support: at least 3+/5 B    Special Tests:  Slump R/L:  neg " / neg    Palpation: no tenderness to lumbar spinous processes, paraspinals, pelvic crests, or glutes B           KEY  NT = not tested this visit  p! = pain  ~ = approximation            Treatments:  Eval/TX, HEP, Safety Education:   Pt was educated on HEP, anatomy and function, examination findings, POC, and goals of therapy. HEP was reviewed with pt this visit and included all exercises performed below. Printout of exercises were provided to pt at end of session with all questions and concerns answered prior to pt leaving today's evaluation.    Therapeutic Exercise x 8 minutes, 1 unit   Access Code: [email or web address]  - Sit to Stand with Counter Support  - 1-2 x daily - 7 x weekly - 2 sets - 5-8 reps  - Seated Long Arc Quad  - 1-2 x daily - 7 x weekly - 2-3 sets - 10 reps  - Seated March  - 1-2 x daily - 7 x weekly - 2-3 sets - 10 reps  - Seated Hip Abduction with Resistance  - 1-2 x daily - 7 x weekly - 2-3 sets - 10 reps    **Education on going to ED if R hip pain, bruising, swelling or other sx's worsens due to recent fall on to area at home**  **Education and recommendation for pt to use rollator in the home and community due to pt quick LOB without warning.**      EDUCATION:  Home exercise program instructed and issued. Objective exam findings. POC    Goals:    ST weeks:  1. Cynthia Bowman will demonstrate independence and report compliance with HEP to facilitate independent rehab program upon discharge.    LTGs: 6 weeks  1. Cynthia Bowman will perform 5x sit to  < 15 seconds to decrease fall risk.    2. Cynthia Bowman will complete TUG within 12 seconds or less (indicative of higher fall risk) in order to INC balance and enhance safety during ADLs/ IADLs. (> or equal to 12 seconds indicates high risk for falls in older adults. 11-20 seconds is normal for the frail and elderly.)     3. Cynthia Bowman will complete all 4 conditions of ModCTSIB for 30 secs with min to no sway to increase safety, decrease fall  risk, and improve ability to complete functional activities.    4. Cynthia Bowman  will increase FGA to >/= 23/30 to decrease fall risk and improve safety/IND at home. The Functional Gait Assessment(FGA) is 10-item test that assesses dynamic balance and postural stability during gait.  It is used to assessed the following diagnoses: Stroke, Parkinson's Disease, SCI's, Vestibular Disorders, and Geriatrics.  A score of < 22/30 indicates Increased fall risk. Baseline 04/15/24:  /30    5. Cynthia Bowman will increase 6MWT score by >/= 60.98m (minimal detectible change MDC and 50 meters for minimally clinical important difference MDIC in the geriatric stroke population) to improve ability to perform ADLS, IADLS and improve IND at home. Baseline 04/15/24:      6. Cynthia Bowman  will increase the 10MWT score by >/=0.16m/s (MCID) and > 0.8 m/s which is predictive of community ambulation post stroke. Gait speed on the 10MWT in regards to ambulation classification: </= 0.4 m/s household ambulator; 0.4 m/s-0.8 m/s limited community ambulation; > 0.8 m/s community ambulator) Baseline 04/15/24:      7. Cynthia Bowman will complete sit <> stand transfers using BUE, equal weight bearing on LEs, and no major LOB at completion of transfer during 3/3 trials in order to enhance functional mobility and safety.    8. Cynthia Bowman will ambulate with IND/mod IND using SC on even surfaces for community distances without imbalance or major deviation to safely return to [facility] and enhance access to the community.    9. Cynthia Bowman will be able to ascend/descend > or equal to 8 steps with/without use of unilateral/bilateral handrail reciprocally without difficulty in order for patient to be able to ambulate safely on all levels of home and in the community.

## 2024-04-16 ENCOUNTER — APPOINTMENT (OUTPATIENT)
Dept: PAIN MEDICINE | Facility: CLINIC | Age: 89
End: 2024-04-16
Payer: COMMERCIAL

## 2024-04-16 PROBLEM — M54.40 ACUTE RIGHT-SIDED LOW BACK PAIN WITH SCIATICA: Status: ACTIVE | Noted: 2024-04-16

## 2024-04-17 ENCOUNTER — TREATMENT (OUTPATIENT)
Dept: PHYSICAL THERAPY | Facility: CLINIC | Age: 89
End: 2024-04-17
Payer: COMMERCIAL

## 2024-04-17 DIAGNOSIS — M54.40 ACUTE RIGHT-SIDED LOW BACK PAIN WITH SCIATICA, SCIATICA LATERALITY UNSPECIFIED: ICD-10-CM

## 2024-04-17 DIAGNOSIS — M54.16 LUMBAR NEURITIS: ICD-10-CM

## 2024-04-17 DIAGNOSIS — M54.41 ACUTE RIGHT-SIDED LOW BACK PAIN WITH RIGHT-SIDED SCIATICA: Primary | ICD-10-CM

## 2024-04-17 PROCEDURE — 97530 THERAPEUTIC ACTIVITIES: CPT | Mod: GP | Performed by: INTERNAL MEDICINE

## 2024-04-17 PROCEDURE — 97116 GAIT TRAINING THERAPY: CPT | Mod: GP | Performed by: INTERNAL MEDICINE

## 2024-04-22 ENCOUNTER — TREATMENT (OUTPATIENT)
Dept: PHYSICAL THERAPY | Facility: CLINIC | Age: 89
End: 2024-04-22
Payer: COMMERCIAL

## 2024-04-22 DIAGNOSIS — M54.41 ACUTE RIGHT-SIDED LOW BACK PAIN WITH RIGHT-SIDED SCIATICA: Primary | ICD-10-CM

## 2024-04-22 DIAGNOSIS — M54.16 LUMBAR NEURITIS: ICD-10-CM

## 2024-04-22 DIAGNOSIS — M54.40 ACUTE RIGHT-SIDED LOW BACK PAIN WITH SCIATICA, SCIATICA LATERALITY UNSPECIFIED: ICD-10-CM

## 2024-04-22 PROCEDURE — 97112 NEUROMUSCULAR REEDUCATION: CPT | Mod: GP | Performed by: INTERNAL MEDICINE

## 2024-04-22 PROCEDURE — 97116 GAIT TRAINING THERAPY: CPT | Mod: GP | Performed by: INTERNAL MEDICINE

## 2024-04-22 NOTE — PROGRESS NOTES
PHYSICAL THERAPY TREATMENT    Patient Name: Cynthia Bowman  MRN: [id number]  Today's Date: 4/22/2024       Insurance:  Visit number: 3  Insurance Type: Payor: [employer] DUAL COMPLETE / Plan: [employer] DUAL COMPLETE / Product Type: *No Product type* /   Visits are medically necessary; no auth required after eval  Referred by: Rufus Sotelo MD                              Assessment:  - Focus of session on continuing gait training activities, administering M-CTSIB, and progressing dynamic standing balance activities.   - Pt balance/stability improves with increased pacing vs slow controlled focus on task indicating fear of falling component of instability; educated pt and pt son on FOF cycling as predisposing pt for increased falls.   - M-CTSIB scores indicate decrease in fall risk with min sway during EO standing on foam and min to mod sway with EC standing on foam; completing walking exercises on compliant mat surface to challenge proprioceptive systems. Also instructed pt and pt son to explore more supportive footwear as pt overpronates R>L especially on compliant surfaces.  - Requires x3 seated rest breaks required throughout session to manage fatigue and MAURO due to deconditioning.   - No LOB observed throughout session and denies increase in pain at end of session.    Plan:   NV: monitor compliance with use of cane/rollator to manage fall risk, balance with head turns statically, administer 3MWT, assess response to HEP and progress LE strengthening program as jolynn      Therapy Diagnosis:   1. Acute right-sided low back pain with right-sided sciatica          Subjective    In to session accompanied by pt son. Cont to switch cane from R to L hand. Denies falls or adverse events since previous session. Reports she cannot complete stepping over object exercise due to instability; performing in narrow hallway at home. Denies LBP at rest. Compliant with HEP 4x/day.    Precautions:  Fall Risk: Moderate;  "recommend gait belt and CGA when in clinic  Denies: CA, pacemaker, DM,  latex allergy, epilepsy/seizures, or other known cardio/neuro/pulmonary problems   Denies unexpected weight loss/gain, night sweats, or night pain.    Previous surgeries include: none    Objective:  M-CTSIB: 120/120    Treatments:  Therapeutic Activity: NC   Administered FGA; please see objective and assessment sections for more details    Gait Training:  - FWD/BWD walking in // bars x4 laps; CGA  - FWD tandem walking in // bars progressing to outside of // bars    The following were NC this session:   - Reciprocal step-overs in // bars with BUE support>>>no UE support; CGA from therapist  - Trial static tandem and modified tandem standing; unable to complete without UE support or assist from therapist  - NBOS standing; no sway    Neuromuscular Re-ed:   - Static standing on foam pad overground x30\"   - Static standing on foam on foam pad with EO and EC x30\" each side  - FWD walking over gym mat with cane and CGA >>>no cane and CGA  - LAT stepping over gym mat with cane and CGA >>> no cane and CGA     Therapeutic Exercise: NC  Access Code: [email or web address]  - Sit to Stand with Counter Support  - 1-2 x daily - 7 x weekly - 2 sets - 5-8 reps  - Seated Long Arc Quad  - 1-2 x daily - 7 x weekly - 2-3 sets - 10 reps  - Seated March  - 1-2 x daily - 7 x weekly - 2-3 sets - 10 reps  - Seated Hip Abduction with Resistance  - 1-2 x daily - 7 x weekly - 2-3 sets - 10 reps      KEY  NC = not completed this visit   ! = pain  ~ = approximation  // = parallel  RA = re-assessment  NV = next visit    EDUCATION:  - Cues/rationale for gait training and NM re-ed activities  - Educated pt and pt son to trial new shoes with supportive arches as pt tends to overpronates R> L standing on compliant surfaces  - Verbally updated HEP to pause object step over as pt is completing this in a hallway vs along a counter top.  "

## 2024-04-25 NOTE — PROGRESS NOTES
PHYSICAL THERAPY TREATMENT    Patient Name: Cynthia Bowman  MRN: [id number]  Today's Date: 4/26/2024  Time Calculation  Start Time: 0803  Stop Time: 0841  Time Calculation (min): 38 min    Insurance:  Visit number: 4  Insurance Type: Payor: [employer] DUAL COMPLETE / Plan: [employer] DUAL COMPLETE / Product Type: *No Product type* /   Visits are medically necessary; no auth required after eval  Referred by: Rufus Sotelo MD        PT Therapeutic Procedures Time Entry  Neuromuscular Re-Education Time Entry: 22  Therapeutic Exercise Time Entry: 8  Therapeutic Activity Time Entry: 8                     Assessment:  - Focus of session on administering 3MWT, dynamic balance and gait activities, and hip abductor strengthening  - 3MWT results indicate pt is at increase in fall risk; recommend pt continue to use cane in and out of the household. Increased force with stance phase on LLE that might indicate LLD and/or hip weakness  - Demos improved stability and less overpronation of feet with new tennis shoes; recommend pt continue to use for community ambulation  - Improved confidence and stability with reciprocal stepping over obstacles with only one episode of instability requiring Becca to stabilize.  - Requires x3 seated rest breaks required throughout session to manage fatigue and MAURO due to deconditioning.   - No LOB observed throughout session and denies increase in pain at end of session.    Plan:   NV: check for LLD, monitor compliance with use of cane and tennis shoes to manage fall risk, balance with head turns statically,  assess response to last session and progress LE strengthening program as jolynn      Therapy Diagnosis:   1. Acute right-sided low back pain with right-sided sciatica        2. Acute right-sided low back pain with sciatica, sciatica laterality unspecified  Follow Up In Physical Therapy      3. Lumbar neuritis  Follow Up In Physical Therapy        Subjective    In to session  "accompanied by pt son. Cont to switch cane from L to R hand. Denies falls or adverse events since previous session. Denies LBP at rest. Can step over one box at home but cannot do multiple in a row due to FOF.     Precautions:  Fall Risk: Moderate; recommend gait belt and CGA when in clinic  Denies: CA, pacemaker, DM,  latex allergy, epilepsy/seizures, or other known cardio/neuro/pulmonary problems   Denies unexpected weight loss/gain, night sweats, or night pain.    Previous surgeries include: none    Objective:  3MWT: 145.21 meters     Treatments:  Therapeutic Activity:   Administered 3MWT and discussed results; please see objective and assessment sections for more details    Gait Training: NC  - FWD/BWD walking in // bars x4 laps; CGA  - FWD tandem walking in // bars progressing to outside of // bars  - Reciprocal step-overs in // bars with BUE support>>>no UE support; CGA from therapist  - Trial static tandem and modified tandem standing; unable to complete without UE support or assist from therapist  - NBOS standing; no sway    Neuromuscular Re-ed:   - Obstacle course comprising belts on ground, stepping in and out of 6 and 8\" boxes, and hurdles x3 rounds progressing to just reciprocal stepping in and out of boxes and over hurdles.    The following were NC this session:   - Static standing on foam pad overground x30\"   - Static standing on foam on foam pad with EO and EC x30\" each side  - FWD walking over gym mat with cane and CGA >>>no cane and CGA  - LAT stepping over gym mat with cane and CGA >>> no cane and CGA     Therapeutic Exercise:   Access Code: [email or web address]  - LAT walking in // bars x2 laps for hip abductor strengthening  - Standing hip abduction with 2# ankle weights 2x10 side  - Minisquats 2x10  - Standing diagonal kick backs without resistance 2x10 each side    The following were NC this session:   - Sit to Stand with Counter Support  - 1-2 x daily - 7 x weekly - 2 sets - 5-8 reps  - Seated Long Arc " Quad  - 1-2 x daily - 7 x weekly - 2-3 sets - 10 reps  - Seated March  - 1-2 x daily - 7 x weekly - 2-3 sets - 10 reps  - Seated Hip Abduction with Resistance  - 1-2 x daily - 7 x weekly - 2-3 sets - 10 reps      KEY  NC = not completed this visit   ! = pain  ~ = approximation  // = parallel  RA = re-assessment  NV = next visit      EDUCATION:  - 3MWT rationale and results   - Cues/rationale for NM re-ed activities and there-ex

## 2024-04-26 ENCOUNTER — TREATMENT (OUTPATIENT)
Dept: PHYSICAL THERAPY | Facility: CLINIC | Age: 89
End: 2024-04-26
Payer: COMMERCIAL

## 2024-04-26 DIAGNOSIS — M54.40 ACUTE RIGHT-SIDED LOW BACK PAIN WITH SCIATICA, SCIATICA LATERALITY UNSPECIFIED: ICD-10-CM

## 2024-04-26 DIAGNOSIS — M54.41 ACUTE RIGHT-SIDED LOW BACK PAIN WITH RIGHT-SIDED SCIATICA: Primary | ICD-10-CM

## 2024-04-26 DIAGNOSIS — M54.16 LUMBAR NEURITIS: ICD-10-CM

## 2024-04-26 PROCEDURE — 97530 THERAPEUTIC ACTIVITIES: CPT | Mod: GP | Performed by: INTERNAL MEDICINE

## 2024-04-26 PROCEDURE — 97112 NEUROMUSCULAR REEDUCATION: CPT | Mod: GP | Performed by: INTERNAL MEDICINE

## 2024-04-26 PROCEDURE — 97110 THERAPEUTIC EXERCISES: CPT | Mod: GP | Performed by: INTERNAL MEDICINE

## 2024-05-01 ENCOUNTER — TREATMENT (OUTPATIENT)
Dept: PHYSICAL THERAPY | Facility: CLINIC | Age: 89
End: 2024-05-01
Payer: COMMERCIAL

## 2024-05-01 DIAGNOSIS — M54.16 LUMBAR NEURITIS: ICD-10-CM

## 2024-05-01 DIAGNOSIS — M54.40 ACUTE RIGHT-SIDED LOW BACK PAIN WITH SCIATICA, SCIATICA LATERALITY UNSPECIFIED: ICD-10-CM

## 2024-05-01 DIAGNOSIS — M54.41 ACUTE RIGHT-SIDED LOW BACK PAIN WITH RIGHT-SIDED SCIATICA: Primary | ICD-10-CM

## 2024-05-01 PROCEDURE — 97116 GAIT TRAINING THERAPY: CPT | Mod: GP | Performed by: INTERNAL MEDICINE

## 2024-05-01 NOTE — PROGRESS NOTES
PHYSICAL THERAPY TREATMENT    Patient Name: Cynthia Bowman  MRN: [id number]  Today's Date: 5/1/2024       Insurance:  Visit number: 5  Insurance Type: Payor: [employer] DUAL COMPLETE / Plan: [employer] DUAL COMPLETE / Product Type: *No Product type* /   Visits are medically necessary; no auth required after eval  Referred by: Rufus Sotelo MD                              Assessment:  - Focus of session on dynamic gait and endurance activities  - No LLD noted and so suspect proximal hip weakness causing issues down the LE chain and over-pronation in B feet; recommend pt purchase OTC arch support orthotic insert for shoes  - Cont to require multiple seated rest breaks required throughout session to manage fatigue and MAURO; HR and SPO2 WNL during activity but BP noted to be high at rest; pt reports taking BP meds prior to coming to session this morning.  - Overall improvement in sri noted without assistive device and confidence steadily improves with gait over compliant surfaces. One episode of instability walking up ramp due to fatigue with Becca x1 to steady.   - Denies increase in pain at end of session.    Plan:   - assess response to shoe arch inserts/orthotics   - monitor compliance with use of cane and tennis shoes to manage fall risk  - balance with head turns statically,    - assess response to last session and progress proximal hip (glutes) LE strengthening program as jolynn      Therapy Diagnosis:   1. Acute right-sided low back pain with right-sided sciatica          Subjective    In to session accompanied by pt son Siddhartha. Pt reports she will be accompanied her daughter-in-law named May next session and she would like to be referred to as her daughter.    Denies falls or adverse events since previous session. Denies LBP at rest. No problem with exercises; 1x/day. States she feels her R arch is collapsing down to the ground.    Precautions:  Fall Risk: Moderate; recommend gait belt and CGA when  "in clinic  Denies: CA, pacemaker, DM,  latex allergy, epilepsy/seizures, or other known cardio/neuro/pulmonary problems   Denies unexpected weight loss/gain, night sweats, or night pain.    Previous surgeries include: none    Objective:  Vitals:  BP: 144/88 mmHg  HR: 91 BPM  SPO2: 95%    LLD: symmetrical at medial malleoli and B ASIS    Treatments:  Therapeutic Activity: NC  Administered 3MWT and discussed results; please see objective and assessment sections for more details    Gait Training: NC  - Ambulation without assistive device around clinic x3 laps  - Ambulation with cane up and down lobby ramp x2 laps; seated rest break taken between laps  - Stairs 2x12 six inch steps with seated rest break taken between sets. SPO2 95% and  BPM   - FWD and LAT walking over gym mat x2 laps; CGA     The following were NC this session:   - FWD/BWD walking in // bars x4 laps; CGA  - FWD tandem walking in // bars progressing to outside of // bars  - Reciprocal step-overs in // bars with BUE support>>>no UE support; CGA from therapist  - Trial static tandem and modified tandem standing; unable to complete without UE support or assist from therapist  - NBOS standing; no sway    Neuromuscular Re-ed: NC  - Obstacle course comprising belts on ground, stepping in and out of 6 and 8\" boxes, and hurdles x3 rounds progressing to just reciprocal stepping in and out of boxes and over hurdles.  - Static standing on foam pad overground x30\"   - Static standing on foam on foam pad with EO and EC x30\" each side  - FWD walking over gym mat with cane and CGA >>>no cane and CGA  - LAT stepping over gym mat with cane and CGA >>> no cane and CGA     Therapeutic Exercise: NC  Access Code: [email or web address]  - LAT walking in // bars x2 laps for hip abductor strengthening  - Standing hip abduction with 2# ankle weights 2x10 side  - Minisquats 2x10  - Standing diagonal kick backs without resistance 2x10 each side  - Sit to Stand with Counter Support  - " 1-2 x daily - 7 x weekly - 2 sets - 5-8 reps  - Seated Long Arc Quad  - 1-2 x daily - 7 x weekly - 2-3 sets - 10 reps  - Seated March  - 1-2 x daily - 7 x weekly - 2-3 sets - 10 reps  - Seated Hip Abduction with Resistance  - 1-2 x daily - 7 x weekly - 2-3 sets - 10 reps      KEY  NC = not completed this visit   ! = pain  ~ = approximation  // = parallel  RA = re-assessment  NV = next visit      EDUCATION:  - adding arch support in shoes to further counteract over-pronation of feet  - pursed lip breathing for activity pacing during stair negotiation and diaphragmatic breathing to recover during rest  - Cues/rationale for gait interventions

## 2024-05-03 ENCOUNTER — TREATMENT (OUTPATIENT)
Dept: PHYSICAL THERAPY | Facility: CLINIC | Age: 89
End: 2024-05-03
Payer: COMMERCIAL

## 2024-05-03 DIAGNOSIS — M54.40 ACUTE RIGHT-SIDED LOW BACK PAIN WITH SCIATICA, SCIATICA LATERALITY UNSPECIFIED: ICD-10-CM

## 2024-05-03 DIAGNOSIS — M54.41 ACUTE RIGHT-SIDED LOW BACK PAIN WITH RIGHT-SIDED SCIATICA: Primary | ICD-10-CM

## 2024-05-03 DIAGNOSIS — M54.16 LUMBAR NEURITIS: ICD-10-CM

## 2024-05-03 PROCEDURE — 97112 NEUROMUSCULAR REEDUCATION: CPT | Mod: GP,CQ

## 2024-05-06 ENCOUNTER — TREATMENT (OUTPATIENT)
Dept: PHYSICAL THERAPY | Facility: CLINIC | Age: 89
End: 2024-05-06
Payer: COMMERCIAL

## 2024-05-06 DIAGNOSIS — M54.40 ACUTE RIGHT-SIDED LOW BACK PAIN WITH SCIATICA, SCIATICA LATERALITY UNSPECIFIED: ICD-10-CM

## 2024-05-06 DIAGNOSIS — M54.16 LUMBAR NEURITIS: ICD-10-CM

## 2024-05-06 PROCEDURE — 97112 NEUROMUSCULAR REEDUCATION: CPT | Mod: GP | Performed by: INTERNAL MEDICINE

## 2024-05-06 NOTE — PROGRESS NOTES
PHYSICAL THERAPY TREATMENT    Patient Name: Cynthia Bowman  MRN: [id number]  Today's Date: 5/6/2024  Time Calculation  Start Time: 1336  Stop Time: 1415  Time Calculation (min): 39 min    Insurance:  Visit number: 7  Insurance Type: Payor: [employer] DUAL COMPLETE / Plan: [employer] DUAL COMPLETE / Product Type: *No Product type* /   Visits are medically necessary; no auth required after eval  Referred by: Rufus Sotelo MD        PT Therapeutic Procedures Time Entry  Neuromuscular Re-Education Time Entry: 39                 Assessment:  - Focus of session on progressing dynamic standing balance on even and uneven surfaces, glute med strengthening, and reactive balance   - Increased ankle and knee instability/shaking observed with prolonged static standing balance activities. More difficulty with vertical > horizontal head turns; pt tends to lose balance anterior and lateral to the L with Becca from therapist to correct PRN  - Cont to demo B glute med weakness as observed by pelvic instability during single leg standing; verbally updated HEP to add standing hip abduction for further strengthening to address deficits.  -  Does not vocalize increased pain at end of session. All questions answered    Plan:   Dynamic gait/balance  Nv: cup transfer, especially [place]?      Therapy Diagnosis:   1. Acute right-sided low back pain with sciatica, sciatica laterality unspecified  Follow Up In Physical Therapy      2. Lumbar neuritis  Follow Up In Physical Therapy        Subjective    In to session accompanied by pt friend Verónica.  No falls or LOB to report.  Pt using SPC at all times. Put orthotics in tennis shoes today that help the R arch collapse.      Precautions:  Fall Risk: Moderate; recommend gait belt and CGA when in clinic  Denies: CA, pacemaker, DM,  latex allergy, epilepsy/seizures, or other known cardio/neuro/pulmonary problems   Denies unexpected weight loss/gain, night sweats, or night pain.    Previous  "surgeries include: none      Treatments:    Gait Training: NC  - Ambulation without assistive device around clinic x3 laps  - Ambulation with cane up and down lobby ramp x2 laps; seated rest break taken between laps  - Stairs 2x12 six inch steps with seated rest break taken between sets. SPO2 95% and  BPM   - FWD and LAT walking over gym mat x2 laps; CGA     Neuromuscular Re-ed:  - Modified NBOS w/ horizontal and vertical head turns 10x each way  - Modified tandem standing w/ horizontal and vertical head turns 10x each way   - Airex NBOS 30\"  - Airex NBOS EC 25\"; Becca  - Airex horizontal and vertical head turns 10x each way  -Airex fwd step-ups 10x and lateral step-ups 10x  - Hip abduction standing at mat 10x each side  - Hip diagonal kicks at mat 10x each side  - Bungee walking (red) FWD walking x2 laps and x1 LAT to the L; further deferred due to fatigue  - HR/TR (2 finger support) 20x    The following were NC this session:   -Vitals monitored  -Airex WBOS w/ EC 30\"  - FWD/BWD walking in // bars x4 laps, U>>no ue support  - FWD tandem walking in // bars (2 finger support)  2x  - Reciprocal walk over row of canes  in // bars with U then NO UE support 4x  -Seated ankle circles       Therapeutic Exercise: NC  Access Code: [email or web address]  - LAT walking in // bars x2 laps for hip abductor strengthening  - Standing hip abduction with 2# ankle weights 2x10 side  - Minisquats 2x10  - Standing diagonal kick backs without resistance 2x10 each side  - Sit to Stand with Counter Support  - 1-2 x daily - 7 x weekly - 2 sets - 5-8 reps  - Seated Long Arc Quad  - 1-2 x daily - 7 x weekly - 2-3 sets - 10 reps  - Seated March  - 1-2 x daily - 7 x weekly - 2-3 sets - 10 reps  - Seated Hip Abduction with Resistance  - 1-2 x daily - 7 x weekly - 2-3 sets - 10 reps      KEY  NC = not completed this visit   ! = pain  ~ = approximation  // = parallel  RA = re-assessment  NV = next visit      EDUCATION:  - Ex and gait cues.   - " Cues/rationale for balance  interventions  - Verbally updated HEP

## 2024-05-08 ENCOUNTER — TREATMENT (OUTPATIENT)
Dept: PHYSICAL THERAPY | Facility: CLINIC | Age: 89
End: 2024-05-08
Payer: COMMERCIAL

## 2024-05-08 DIAGNOSIS — M54.16 LUMBAR NEURITIS: ICD-10-CM

## 2024-05-08 DIAGNOSIS — M54.41 ACUTE RIGHT-SIDED LOW BACK PAIN WITH RIGHT-SIDED SCIATICA: Primary | ICD-10-CM

## 2024-05-08 DIAGNOSIS — M54.40 ACUTE RIGHT-SIDED LOW BACK PAIN WITH SCIATICA, SCIATICA LATERALITY UNSPECIFIED: ICD-10-CM

## 2024-05-08 PROCEDURE — 97112 NEUROMUSCULAR REEDUCATION: CPT | Mod: GP | Performed by: INTERNAL MEDICINE

## 2024-05-08 NOTE — PROGRESS NOTES
PHYSICAL THERAPY TREATMENT    Patient Name: Cynthia Bowman  MRN: [id number]  Today's Date: 5/8/2024  Time Calculation  Start Time: 1330  Stop Time: 1408  Time Calculation (min): 38 min    Insurance:  Visit number: 8  Insurance Type: Payor: [employer] DUAL COMPLETE / Plan: [employer] DUAL COMPLETE / Product Type: *No Product type* /   Visits are medically necessary; no auth required after eval  Referred by: Rufus Sotelo MD        PT Therapeutic Procedures Time Entry  Neuromuscular Re-Education Time Entry: 38                 Assessment:  - Focus of session on progressing dynamic standing balance on even and uneven surfaces, functional balance activities, reactive balance on uneven surfaces, and ankle/LE control.   - No anterior LOB observed during balance activities on uneven surfaces today. Still appropriately challenged with modified tandem standing on even and uneven surfaces as well as EC variation of activities. Requires intermittent Becca from therapist to steady.  - No difficulty with object transfer below, at, or above shld level  - Does not vocalize increased pain at end of session. All questions answered    Plan:   Dynamic gait/balance  Vinhuncing ball, duel tasking with ambulation      Therapy Diagnosis:   1. Acute right-sided low back pain with right-sided sciatica        2. Acute right-sided low back pain with sciatica, sciatica laterality unspecified  Follow Up In Physical Therapy      3. Lumbar neuritis  Follow Up In Physical Therapy        Subjective    In to session accompanied by pt friend Verónica. No falls or LOB to report.  Pt using SPC at all times; will intermittently  device and walk with it. Requesting to complete similar exercises as performed last session.       Precautions:  Fall Risk: Moderate; recommend gait belt and CGA when in clinic  Denies: CA, pacemaker, DM,  latex allergy, epilepsy/seizures, or other known cardio/neuro/pulmonary problems   Denies unexpected weight  "loss/gain, night sweats, or night pain.    Previous surgeries include: none    Treatments:    Gait Training: NC  - Ambulation without assistive device around clinic x3 laps  - Ambulation with cane up and down lobby ramp x2 laps; seated rest break taken between laps  - Stairs 2x12 six inch steps with seated rest break taken between sets. SPO2 95% and  BPM   - FWD and LAT walking over gym mat x2 laps; CGA     Neuromuscular Re-ed:  - Modified NBOS w/ horizontal and vertical head turns 10x each way  - Full tandem static holding with each foot in front   - Modified tandem static standing holding x30\" each side; easier with L foot in front   - Modified tandem standing (L foot in front) w/ horizontal and vertical head turns 10x each way; Becca   - Airex NBOS 30\"  - Airex NBOS EC 30\"; CGA   - Clock reaches with UUE support 5x each side   - Cup transfers below shld level 10x  - Cup transfers above shld level 10x  - Getting dishes in and out of second level of cabinet. Briefly discussed correct/safe body mechanics when loading and unloading  via squatting and golfer's lift techniques  - Ball toss, normal SUDHIR, overground and on Airex foam pad   - Ball toss, NBOS, on Airex foam pad   - Ball toss, modified tandem with L foot in front, on Airex foam pad   - Fitter/rocker board 10 taps anterior/posterior and 10x taps medial/lateral. No UE support with CGA to Becca from therapist    The following were NC this session:   - Airex horizontal and vertical head turns 10x each way  -Airex fwd step-ups 10x and lateral step-ups 10x  - Hip abduction standing at mat 10x each side  - Hip diagonal kicks at mat 10x each side  - Bungee walking (red) FWD walking x2 laps and x1 LAT to the L; further deferred due to fatigue  - HR/TR (2 finger support) 20x  -Vitals monitored  -Airex WBOS w/ EC 30\"  - FWD/BWD walking in // bars x4 laps, U>>no ue support  - FWD tandem walking in // bars (2 finger support)  2x  - Reciprocal walk over row " of canes  in // bars with U then NO UE support 4x  -Seated ankle circles     Therapeutic Exercise: NC  Access Code: [email or web address]  - LAT walking in // bars x2 laps for hip abductor strengthening  - Standing hip abduction with 2# ankle weights 2x10 side  - Minisquats 2x10  - Standing diagonal kick backs without resistance 2x10 each side  - Sit to Stand with Counter Support  - 1-2 x daily - 7 x weekly - 2 sets - 5-8 reps  - Seated Long Arc Quad  - 1-2 x daily - 7 x weekly - 2-3 sets - 10 reps  - Seated March  - 1-2 x daily - 7 x weekly - 2-3 sets - 10 reps  - Seated Hip Abduction with Resistance  - 1-2 x daily - 7 x weekly - 2-3 sets - 10 reps      KEY  NC = not completed this visit   ! = pain  ~ = approximation  // = parallel  RA = re-assessment  NV = next visit      EDUCATION:  - Cues/rationale for balance  interventions

## 2024-05-13 ENCOUNTER — TREATMENT (OUTPATIENT)
Dept: PHYSICAL THERAPY | Facility: CLINIC | Age: 89
End: 2024-05-13
Payer: COMMERCIAL

## 2024-05-13 DIAGNOSIS — M54.41 ACUTE RIGHT-SIDED LOW BACK PAIN WITH RIGHT-SIDED SCIATICA: Primary | ICD-10-CM

## 2024-05-13 DIAGNOSIS — M54.16 LUMBAR NEURITIS: ICD-10-CM

## 2024-05-13 DIAGNOSIS — M54.40 ACUTE RIGHT-SIDED LOW BACK PAIN WITH SCIATICA, SCIATICA LATERALITY UNSPECIFIED: ICD-10-CM

## 2024-05-13 PROCEDURE — 97116 GAIT TRAINING THERAPY: CPT | Mod: GP | Performed by: INTERNAL MEDICINE

## 2024-05-13 PROCEDURE — 97112 NEUROMUSCULAR REEDUCATION: CPT | Mod: GP | Performed by: INTERNAL MEDICINE

## 2024-05-13 NOTE — PROGRESS NOTES
PHYSICAL THERAPY TREATMENT    Patient Name: Cynthia Bowman  MRN: [id number]  Today's Date: 5/13/2024  Time Calculation  Start Time: 1328  Stop Time: 1412  Time Calculation (min): 44 min    Insurance:  Visit number: 9  Insurance Type: Payor: [employer] DUAL COMPLETE / Plan: [employer] DUAL COMPLETE / Product Type: *No Product type* /   Visits are medically necessary; no auth required after eval  Referred by: Rufus Sotelo MD        PT Therapeutic Procedures Time Entry  Neuromuscular Re-Education Time Entry: 19  Gait Training Time Entry: 25                 Assessment:  - Focus of session on progressing gait and dynamic balance interventions.  - No LOB observed during gait or balance activities today. More difficulty lifting RLE during LLE SLS; recommend challenging SLS in future sessions. Still appropriately challenged with head turning standing on uneven surfaces; requires intermittent UE support. Requires intermittent CGA from therapist to steady.  - Does not vocalize increased pain at end of session. All questions answered prior to exiting clinic.    Plan:   Dynamic gait/balance  Duel tasking with ambulation      Therapy Diagnosis:   1. Acute right-sided low back pain with right-sided sciatica        2. Acute right-sided low back pain with sciatica, sciatica laterality unspecified  Follow Up In Physical Therapy      3. Lumbar neuritis  Follow Up In Physical Therapy        Subjective    In to session accompanied by pt friend Verónica. No falls or LOB to report.  Pt using SPC at all times. No other concerns at this time.     Precautions:  Fall Risk: Moderate; recommend gait belt and CGA when in clinic  Denies: CA, pacemaker, DM,  latex allergy, epilepsy/seizures, or other known cardio/neuro/pulmonary problems   Denies unexpected weight loss/gain, night sweats, or night pain.    Previous surgeries include: none    Treatments:    Gait Training:  - Ambulation bouncing ball x3 laps; requires seated rest breaks  "in between laps (B, U UE)  - ambulation carrying ball on tray x 1 lap, requires seated rest break   - Ambulation w/ stool kicks : 1st lap w/ HHA from clinician; 2nd lap no ue support    The following were NC this session:   - Ambulation without assistive device around clinic x3 laps  - Ambulation with cane up and down lobby ramp x2 laps; seated rest break taken between laps  - Stairs 2x12 six inch steps with seated rest break taken between sets. SPO2 95% and  BPM   - FWD and LAT walking over gym mat x2 laps; CGA     Neuromuscular Re-ed:  - Fitter/rocker board 10 taps anterior/posterior and 10x taps medial/lateral  - Airex NBOS 30\"  - Heel/toe raises on foam pad 20x  - Airex vertical and horizontal head turns 10x each way  - SLS 4 corner taps to 4\" step 5x each side     The following were NC this session:   - Modified NBOS w/ horizontal and vertical head turns 10x each way  - Full tandem static holding with each foot in front   - Modified tandem static standing holding x30\" each side; easier with L foot in front   - Modified tandem standing (L foot in front) w/ horizontal and vertical head turns 10x each way; Becca   - Airex NBOS EC 30\"; CGA   - Clock reaches with UUE support 5x each side   - Cup transfers below shld level 10x  - Cup transfers above shld level 10x  - Getting dishes in and out of second level of cabinet. Briefly discussed correct/safe body mechanics when loading and unloading  via squatting and golfer's lift techniques  - Ball toss, normal SUDHIR, overground and on Airex foam pad   - Ball toss, NBOS, on Airex foam pad   - Ball toss, modified tandem with L foot in front, on Airex foam pad   -Airex fwd step-ups 10x and lateral step-ups 10x  - Hip abduction standing at mat 10x each side  - Hip diagonal kicks at mat 10x each side  - Bungee walking (red) FWD walking x2 laps and x1 LAT to the L; further deferred due to fatigue  - HR/TR (2 finger support) 20x  -Vitals monitored  -Airex WBOS w/ EC " "30\"  - FWD/BWD walking in // bars x4 laps, U>>no ue support  - FWD tandem walking in // bars (2 finger support)  2x  - Reciprocal walk over row of canes  in // bars with U then NO UE support 4x  -Seated ankle circles     Therapeutic Exercise: NC  Access Code: [email or web address]  - LAT walking in // bars x2 laps for hip abductor strengthening  - Standing hip abduction with 2# ankle weights 2x10 side  - Minisquats 2x10  - Standing diagonal kick backs without resistance 2x10 each side  - Sit to Stand with Counter Support  - 1-2 x daily - 7 x weekly - 2 sets - 5-8 reps  - Seated Long Arc Quad  - 1-2 x daily - 7 x weekly - 2-3 sets - 10 reps  - Seated March  - 1-2 x daily - 7 x weekly - 2-3 sets - 10 reps  - Seated Hip Abduction with Resistance  - 1-2 x daily - 7 x weekly - 2-3 sets - 10 reps      KEY  NC = not completed this visit   ! = pain  ~ = approximation  // = parallel  RA = re-assessment  NV = next visit      EDUCATION:  - Cues/rationale for gait and balance interventions  "

## 2024-05-15 ENCOUNTER — TREATMENT (OUTPATIENT)
Dept: PHYSICAL THERAPY | Facility: CLINIC | Age: 89
End: 2024-05-15
Payer: COMMERCIAL

## 2024-05-15 DIAGNOSIS — M54.40 ACUTE RIGHT-SIDED LOW BACK PAIN WITH SCIATICA, SCIATICA LATERALITY UNSPECIFIED: ICD-10-CM

## 2024-05-15 DIAGNOSIS — M54.16 LUMBAR NEURITIS: ICD-10-CM

## 2024-05-15 DIAGNOSIS — M54.41 ACUTE RIGHT-SIDED LOW BACK PAIN WITH RIGHT-SIDED SCIATICA: Primary | ICD-10-CM

## 2024-05-15 PROCEDURE — 97112 NEUROMUSCULAR REEDUCATION: CPT | Mod: GP | Performed by: INTERNAL MEDICINE

## 2024-05-15 PROCEDURE — 97116 GAIT TRAINING THERAPY: CPT | Mod: GP | Performed by: INTERNAL MEDICINE

## 2024-05-15 NOTE — PROGRESS NOTES
PHYSICAL THERAPY TREATMENT    Patient Name: Cynthia Bowman  MRN: [id number]  Today's Date: 5/15/2024  Time Calculation  Start Time: 1331  Stop Time: 1409  Time Calculation (min): 38 min    Insurance:  Visit number: 10  Insurance Type: Payor: [employer] DUAL COMPLETE / Plan: [employer] DUAL COMPLETE / Product Type: *No Product type* /   Visits are medically necessary; no auth required after eval  Referred by: Rufus Sotelo MD        PT Therapeutic Procedures Time Entry  Neuromuscular Re-Education Time Entry: 24  Gait Training Time Entry: 14                 Assessment:  - Focus of session on progressing gait and dynamic balance interventions.  - Cont to be challenged with head turning and duel tasking with distractions while completing balance and walking tasks. Increased fatigue noted today vs previous session with more B LE shaking evident; instructed pt to cont LE strengthening at home via HEP  - Requires modA x1 to stabilize pt after LOB while pushing stool. No other LOB observed throughout session  - Difficulty with duel tasking with ambulation; recommend revisiting in future sessions  - Does not vocalize increased pain at end of session. All questions answered prior to exiting clinic.    Plan:   Dynamic gait/balance  Duel tasking with ambulation      Therapy Diagnosis:   1. Acute right-sided low back pain with right-sided sciatica        2. Acute right-sided low back pain with sciatica, sciatica laterality unspecified  Follow Up In Physical Therapy      3. Lumbar neuritis  Follow Up In Physical Therapy          Subjective    In to session accompanied by pt friend Verónica. No falls or LOB to report.  Pt using SPC at all times. Still feels like she has trouble with balancing with head turns.    Precautions:  Fall Risk: Moderate; recommend gait belt and CGA when in clinic  Denies: CA, pacemaker, DM,  latex allergy, epilepsy/seizures, or other known cardio/neuro/pulmonary problems   Denies unexpected  "weight loss/gain, night sweats, or night pain.    Previous surgeries include: none    Treatments:    Gait Training:  - Ambulation carrying ball on tray x 3 laps  - Ambulation w/ stool kicks : 1.5 laps. Two episodes of LOB with Becca to steady on first and modA for second after pt looks up at other staff member  - Duel tasking: walking around clinic counter backwards by 3s    The following were NC this session:   - Ambulation bouncing ball x3 laps; requires seated rest breaks in between laps (B, U UE)  - Ambulation without assistive device around clinic x3 laps  - Ambulation with cane up and down lobby ramp x2 laps; seated rest break taken between laps  - Stairs 2x12 six inch steps with seated rest break taken between sets. SPO2 95% and  BPM   - FWD and LAT walking over gym mat x2 laps; CGA     Neuromuscular Re-ed:  - NBOS vertical and horizontal head turns 10x each side   - Modified NBOS w/ horizontal and vertical head turns 10x each way  - 3x6 STS from arm chair on to foam pad with CGA from therapist  - Bouncing ball on to mat with NBOS and modified tandem standing each way. 20 bounces each way     The following were NC this session:   - Fitter/rocker board 10 taps anterior/posterior and 10x taps medial/lateral  - Airex NBOS 30\"  - Heel/toe raises on foam pad 20x  - Airex vertical and horizontal head turns 10x each way  - SLS 4 corner taps to 4\" step 5x each side   - Full tandem static holding with each foot in front   - Modified tandem static standing holding x30\" each side; easier with L foot in front   - Modified tandem standing (L foot in front) w/ horizontal and vertical head turns 10x each way; Becca   - Airex NBOS EC 30\"; CGA   - Clock reaches with UUE support 5x each side   - Cup transfers below shld level 10x  - Cup transfers above shld level 10x  - Getting dishes in and out of second level of cabinet. Briefly discussed correct/safe body mechanics when loading and unloading  via squatting and " "golfer's lift techniques  - Ball toss, normal SUDHIR, overground and on Airex foam pad   - Ball toss, NBOS, on Airex foam pad   - Ball toss, modified tandem with L foot in front, on Airex foam pad   -Airex fwd step-ups 10x and lateral step-ups 10x  - Hip abduction standing at mat 10x each side  - Hip diagonal kicks at mat 10x each side  - Bungee walking (red) FWD walking x2 laps and x1 LAT to the L; further deferred due to fatigue  - HR/TR (2 finger support) 20x  -Vitals monitored  -Airex WBOS w/ EC 30\"  - FWD/BWD walking in // bars x4 laps, U>>no ue support  - FWD tandem walking in // bars (2 finger support)  2x  - Reciprocal walk over row of canes  in // bars with U then NO UE support 4x  -Seated ankle circles     Therapeutic Exercise: NC  Access Code: [email or web address]  - LAT walking in // bars x2 laps for hip abductor strengthening  - Standing hip abduction with 2# ankle weights 2x10 side  - Minisquats 2x10  - Standing diagonal kick backs without resistance 2x10 each side  - Sit to Stand with Counter Support  - 1-2 x daily - 7 x weekly - 2 sets - 5-8 reps  - Seated Long Arc Quad  - 1-2 x daily - 7 x weekly - 2-3 sets - 10 reps  - Seated March  - 1-2 x daily - 7 x weekly - 2-3 sets - 10 reps  - Seated Hip Abduction with Resistance  - 1-2 x daily - 7 x weekly - 2-3 sets - 10 reps      KEY  NC = not completed this visit   ! = pain  ~ = approximation  // = parallel  RA = re-assessment  NV = next visit      EDUCATION:  - Cues/rationale for gait and balance interventions  "

## 2024-05-19 DIAGNOSIS — I65.23 CAROTID STENOSIS, BILATERAL: ICD-10-CM

## 2024-05-20 ENCOUNTER — TREATMENT (OUTPATIENT)
Dept: PHYSICAL THERAPY | Facility: CLINIC | Age: 89
End: 2024-05-20
Payer: COMMERCIAL

## 2024-05-20 DIAGNOSIS — M54.40 ACUTE RIGHT-SIDED LOW BACK PAIN WITH SCIATICA, SCIATICA LATERALITY UNSPECIFIED: ICD-10-CM

## 2024-05-20 DIAGNOSIS — M54.41 ACUTE RIGHT-SIDED LOW BACK PAIN WITH RIGHT-SIDED SCIATICA: Primary | ICD-10-CM

## 2024-05-20 DIAGNOSIS — M54.16 LUMBAR NEURITIS: ICD-10-CM

## 2024-05-20 PROCEDURE — 97112 NEUROMUSCULAR REEDUCATION: CPT | Mod: GP,CQ

## 2024-05-20 PROCEDURE — 97116 GAIT TRAINING THERAPY: CPT | Mod: GP,CQ

## 2024-05-20 RX ORDER — ATORVASTATIN CALCIUM 10 MG/1
10 TABLET, FILM COATED ORAL NIGHTLY
Qty: 90 TABLET | Refills: 3 | Status: SHIPPED | OUTPATIENT
Start: 2024-05-20

## 2024-05-20 NOTE — PROGRESS NOTES
"PHYSICAL THERAPY TREATMENT    Patient Name: Cynthia Bowman  MRN: [id number]  Today's Date: 5/20/2024  Time Calculation  Start Time: 1315  Stop Time: 1355  Time Calculation (min): 40 min    Insurance:  Visit number: 11  Insurance Type: Payor: [employer] DUAL COMPLETE / Plan: [employer] DUAL COMPLETE / Product Type: *No Product type* /   Visits are medically necessary; no auth required after eval  Referred by: Rufus Sotelo MD        PT Therapeutic Procedures Time Entry  Neuromuscular Re-Education Time Entry: 14  Gait Training Time Entry: 26                 Assessment:  - Focus of session on progressing gait and dynamic balance interventions Specific to pt's daily life.  + LOB is noted intermittently w/ stool kicks or HR/TR if no Ue support provided, however, today, w/ encouragement, pt able to take corrective steps required to regain balance.  + sway to the R evident w/ ambulation w/ rapid head turns, encouraged pt to perform at slower rate for safety.  Pt barely able to perform partial tandem stance on land, encouraged pt to discontinue at home for safety. reviewed safety picking objects off floor: pt requires max B UE support on // bar  to get self off floor I.  Recommended pt resume use of home grabber for safety. Per subjective reports, pt has resumed all household activities required of her.    Plan:   Re-assess next 1-2 visits.  ? If pt reached plateau per subjective reports.  Assess for BPPV due to report of \"dizziness\" (denies room spinning) w/ head turns specifically??      Therapy Diagnosis:   1. Acute right-sided low back pain with right-sided sciatica        2. Acute right-sided low back pain with sciatica, sciatica laterality unspecified  Follow Up In Physical Therapy      3. Lumbar neuritis  Follow Up In Physical Therapy          Subjective    In to session accompanied by pt friend Verónica. No falls or LOB to report.  Pt using SPC at all times. Still feels like she has trouble with balancing " "with head turns \"dizzy\".. Main goal is to walk to stores, or medical appts.  Able to walk in store w/ cart w/o difficulty.  Has no desire to walk on grass (attempted over the weekend then turned around and discontinued).  Pt doing all her housework : feels SOB limits her more than her balance. Pt able to do laundry in basement: drags laundry in garbage bag both directions.  Overall, pt unable to state that balance or LE strength has improved since PT began, despite + HEP compliance.     Precautions:  Fall Risk: Moderate; recommend gait belt and CGA when in clinic  Denies: CA, pacemaker, DM,  latex allergy, epilepsy/seizures, or other known cardio/neuro/pulmonary problems   Denies unexpected weight loss/gain, night sweats, or night pain.    Previous surgeries include: none    Treatments:    Gait Training:  -discussed activities pt is and is not performing in home and community: pt reports she is doing all activities required of her  - Ambulation w/ stool kicks : 1 laps.  -Walk in clinic w/ hor, vert head turns 2x ea direction, w/ and w/o SPC   -reviewed safety picking objects off floor: pt requires max B UE support on // bar  to get self off floor I.  Recommended pt resume use of home grabber.   - Stairs : reciprocal, 2 HR 1x   -declined gait outdoors due to hot weather    Neuromuscular Re-ed:  - NBOS vertical and horizontal head turns 10x each side   - 3x6 STS from arm chair on to foam pad with CGA from therapist  - HR/TR (2 finger support) 10xea, no ue's, corrective steps when LOB occurs  -FR, ML step over Kleenex boxes using SPC only 6xea     Therapeutic Exercise: NC/verbal review in [place] only  Access Code: [email or web address]      KEY  NC = not completed this visit   ! = pain  ~ = approximation  // = parallel  RA = re-assessment  NV = next visit      EDUCATION:  - Cues/rationale for gait and balance interventions  -use grabber, discontinue partial tandem stance for safety at home  "

## 2024-05-21 NOTE — PROGRESS NOTES
PHYSICAL THERAPY TREATMENT    Patient Name: Cynthia Bowman  MRN: [id number]  Today's Date: 5/22/2024  Time Calculation  Start Time: 1410  Stop Time: 1445  Time Calculation (min): 35 min    Insurance:  Visit number: 12  Insurance Type: Payor: [employer] DUAL COMPLETE / Plan: [employer] DUAL COMPLETE / Product Type: *No Product type* /   Visits are medically necessary; no auth required after eval  Referred by: Rufus Sotelo MD        PT Therapeutic Procedures Time Entry  Neuromuscular Re-Education Time Entry: 35                 Assessment:  - Focus of session on progressing gait and dynamic balance interventions. Improved stability with walking head turns today without overt LOB   - Able to perform objective  and toss activity with grabber on even and uneven surfaces also without LOB; CGA throughout.  - Cont to have difficulty stepping over object with increased utilization of SPC for stability; Becca at times to stabilize.  - Per subjective reports, pt has resumed all household activities required of her and is comfortable continuing HEP independently after next visit.  - Denies increased pain at end of session. Exits clinic with all questions answered.    Plan:   - LISA SERRANO; anticipate 30-60 day chart hold      Therapy Diagnosis:   1. Acute right-sided low back pain with right-sided sciatica        2. Acute right-sided low back pain with sciatica, sciatica laterality unspecified  Follow Up In Physical Therapy      3. Lumbar neuritis  Follow Up In Physical Therapy          Subjective    In to session accompanied by pt friend Verónica. No falls or LOB to report.  Pt using SPC at all times. Still feels like she has trouble with balancing with head turns but is not getting. Pt discovered new work out machine in basement that assists with sitting and standing exercise; completed 30x each setting. Went to store yesterday without rest breaks required or issues.    Precautions:  Fall Risk: Moderate; recommend  gait belt and CGA when in clinic  Denies: CA, pacemaker, DM,  latex allergy, epilepsy/seizures, or other known cardio/neuro/pulmonary problems   Denies unexpected weight loss/gain, night sweats, or night pain.    Previous surgeries include: none    Treatments:    Gait Training: NC  - Stairs : reciprocal, 2 HR 1x   -declined gait outdoors due to hot weather  -discussed activities pt is and is not performing in home and community: pt reports she is doing all activities required of her  - Ambulation w/ stool kicks : 1 laps  -reviewed safety picking objects off floor: pt requires max B UE support on // bar  to get self off floor I.  Recommended pt resume use of home grabber.     Neuromuscular Re-ed:  - Walk in clinic w/ hor, vert head turns 2x ea direction, w/ SPC   - Practice picking up objects with UE reacher/grabber and tossing in to bucket x1 round overground and x2 rounds over gym mat   - LAT and FWD step over Kleenex and shoe boxes using SPC in // bars x6 laps each way    The following were NC this session:   - NBOS vertical and horizontal head turns 10x each side   - 3x6 STS from arm chair on to foam pad with CGA from therapist  - HR/TR (2 finger support) 10xea, no ue's, corrective steps when LOB occurs      Therapeutic Exercise: NC  Access Code: [email or web address]      KEY  NC = not completed this visit   ! = pain  ~ = approximation  // = parallel  RA = re-assessment  NV = next visit      EDUCATION:  - Cues/rationale for gait and balance interventions

## 2024-05-22 ENCOUNTER — TREATMENT (OUTPATIENT)
Dept: PHYSICAL THERAPY | Facility: CLINIC | Age: 89
End: 2024-05-22
Payer: COMMERCIAL

## 2024-05-22 DIAGNOSIS — M54.41 ACUTE RIGHT-SIDED LOW BACK PAIN WITH RIGHT-SIDED SCIATICA: Primary | ICD-10-CM

## 2024-05-22 DIAGNOSIS — M54.40 ACUTE RIGHT-SIDED LOW BACK PAIN WITH SCIATICA, SCIATICA LATERALITY UNSPECIFIED: ICD-10-CM

## 2024-05-22 DIAGNOSIS — M54.16 LUMBAR NEURITIS: ICD-10-CM

## 2024-05-22 PROCEDURE — 97112 NEUROMUSCULAR REEDUCATION: CPT | Mod: GP | Performed by: INTERNAL MEDICINE

## 2024-05-29 ENCOUNTER — APPOINTMENT (OUTPATIENT)
Dept: PHYSICAL THERAPY | Facility: CLINIC | Age: 89
End: 2024-05-29
Payer: COMMERCIAL

## 2024-06-06 ENCOUNTER — OFFICE VISIT (OUTPATIENT)
Dept: PAIN MEDICINE | Facility: CLINIC | Age: 89
End: 2024-06-06
Payer: COMMERCIAL

## 2024-06-06 ENCOUNTER — APPOINTMENT (OUTPATIENT)
Dept: PAIN MEDICINE | Facility: CLINIC | Age: 89
End: 2024-06-06
Payer: COMMERCIAL

## 2024-06-06 VITALS
SYSTOLIC BLOOD PRESSURE: 144 MMHG | WEIGHT: 190 LBS | HEART RATE: 77 BPM | OXYGEN SATURATION: 97 % | DIASTOLIC BLOOD PRESSURE: 76 MMHG | RESPIRATION RATE: 15 BRPM | TEMPERATURE: 97.7 F | BODY MASS INDEX: 29.76 KG/M2

## 2024-06-06 DIAGNOSIS — G89.29 CHRONIC RIGHT-SIDED LOW BACK PAIN WITH RIGHT-SIDED SCIATICA: ICD-10-CM

## 2024-06-06 DIAGNOSIS — M51.26 LUMBAR DISC HERNIATION: ICD-10-CM

## 2024-06-06 DIAGNOSIS — M54.16 LUMBAR NEURITIS: Primary | ICD-10-CM

## 2024-06-06 DIAGNOSIS — M54.41 CHRONIC RIGHT-SIDED LOW BACK PAIN WITH RIGHT-SIDED SCIATICA: ICD-10-CM

## 2024-06-06 PROCEDURE — 99214 OFFICE O/P EST MOD 30 MIN: CPT | Performed by: ANESTHESIOLOGY

## 2024-06-06 ASSESSMENT — PAIN SCALES - GENERAL
PAINLEVEL_OUTOF10: 0 - NO PAIN
PAINLEVEL: 0-NO PAIN

## 2024-06-06 ASSESSMENT — ENCOUNTER SYMPTOMS
LOSS OF SENSATION IN FEET: 1
OCCASIONAL FEELINGS OF UNSTEADINESS: 1

## 2024-06-06 ASSESSMENT — PAIN - FUNCTIONAL ASSESSMENT: PAIN_FUNCTIONAL_ASSESSMENT: NO/DENIES PAIN

## 2024-06-06 ASSESSMENT — PAIN DESCRIPTION - DESCRIPTORS: DESCRIPTORS: ACHING

## 2024-06-06 NOTE — PROGRESS NOTES
History Of Present Illness  Cynthia Bowman is a 89 y.o. female presenting with   Chief Complaint   Patient presents with    Follow-up       Patient returns regarding slow return chronic low back pain to the RLE.  She reports therapy has been helpful this far and has been doing therapy and using a heating pad and tylenol that have been helpful for her.     The pain is constant, worse with activity and better with nothing. The pain is irritating sharp, stabbing and shooting to the RLE. Denies LE paresthesias, weakness, saddle anesthesia, bowel or bladder incontinence. To manage this pain the patient has attempted Percocet via the ED but it made her drowsy. The patients chronic hx of DVT and Pancreas and was started on XARELTO, HTN, are stable on medication management.     PAIN SCORE: 0 - 5 / 10    PCP: Dr. Reddy ()       Past Medical History  She has a past medical history of elevated lipids, Hypertension, and Pulmonary emboli (Multi).    Surgical History  She has a past surgical history that includes Other surgical history (12/08/2021).     Social History  She reports that she has never smoked. She has never used smokeless tobacco. She reports that she does not drink alcohol and does not use drugs.    Denies any tobacco usage.     Family History  Family History   Problem Relation Name Age of Onset    Cancer Father          type unknown    Cancer Sister          type unknown        Allergies  Patient has no known allergies.    Review of Systems    All other systems reviewed and negative for any deficits. Pertinent positives and negatives were considered in the medical decision making process.        Physical Exam  /76   Pulse 77   Temp 36.5 °C (97.7 °F)   Resp 15   Wt 86.2 kg (190 lb)   SpO2 97%     General: Pt appears stated age    Eyes: Conjunctiva non-icteric and lids without obvious rash or drooping. Pupils are symmetric    ENT: External ears and nose appear to be without deformity or rash. No  lesions or masses noted. Hearing is grossly intact    Neck: No JVD noted, tracheal position midline. No goiter noted on assessment of thyroid    Respiratory: No gasping or shortness of breath noted, no use of accessory muscles noted    Cardiovascular: Extremities show no edema or varicosities    Skin: No rashes or open lesions/ulcers identified on skin. No induration/tightening noted with palpation of skin    Musculoskeletal: Gait is grossly normal    Digits/nails show no clubbing or cyanosis    Exam of muscles/joints/bones shows no gross atrophy and no abnormal/involuntary movements in the head/neckNo asymmetry or masses noted in the head/neck    Stability: no subluxation noted on movement of bilateral upper extremities or head/neck    Strength: 4/5 in RLE and 5/5 LLE     Range of Motion: WNL     Neurologic: Reflexes: 2+     Sensation: WNL    Cranial nerves 2-12 are grossly intact    Psychiatric: Pt is alert and oriented to time, place and person.         Assessment/Plan   1. Lumbar neuritis        2. Lumbar disc herniation        3. Chronic right-sided low back pain with right-sided sciatica             1. I have provided the patient with a list of physical therapy exercises to learn and perform to strengthen core, maintain stabilization, and reduce pain. We reviewed the exercises in detail and I encouraged them to perform them on a regular basis.    I have referred the patient to physical therapy/aqua therapy to learn and perform exercises to strengthen core/extremties, maintain stabilization, increase range of motion, and reduce pain.    2. I would recommend the pt start on Gabapentin to help with nerve related pain. We discussed the risks, benefits, and side effects to this medication including the mechanism of action and the pt understands and will hold off for now.     3. I extensively reviewed the patients CT scan ( ED) findings in detail, including review of the actual images and provided a detailed  explanation of the findings using a spine model.     4.  The patient remains a candidate for an LESI L5/S1 to treat low back and radicular pain. I spent time with the patient discussing all of the risks, benefits, and alternatives to this measure. Including but not limited to worsening pain with injection, no improvement with pain, numbness in the lower extremity, vagal reaction, hypotension, feeling lightheaded/dizzy, spinal infection, epidural hematoma/abscess, paralysis, nerve injury, steroid effects, and spinal headache.The patient understands ALL of these risks and agrees to proceed with the planned procedure.    We will contact the patients PCP to determine if it is safe to stop ASA 7 DAYS AND XARELTO for 5 days prior to procedure. They will also need to have their INR checked the day of the procedure. We did discuss the risks for stopping anticoagulation including, but not limited to any cardiovascular event, embolism, and even death. The patient understands these risks and would like to proceed with the procedure.    Return after you complete therapy or sooner if condition changes.     Rufus Sotelo MD    I spent time with the patient reviewing their imaging and discussing the risks benefits and alternatives to the above plan. A total of 30 minutes was spent reviewing the data and greater than 50% of that time was with the patient during the face to face encounter discussing treatment options both surgical, non-surgical, and minimally invasive techniques.

## 2024-06-12 ENCOUNTER — APPOINTMENT (OUTPATIENT)
Dept: PRIMARY CARE | Facility: CLINIC | Age: 89
End: 2024-06-12
Payer: COMMERCIAL

## 2024-06-20 ENCOUNTER — APPOINTMENT (OUTPATIENT)
Dept: PRIMARY CARE | Facility: CLINIC | Age: 89
End: 2024-06-20
Payer: COMMERCIAL

## 2024-06-20 VITALS
HEART RATE: 82 BPM | BODY MASS INDEX: 30.76 KG/M2 | OXYGEN SATURATION: 95 % | HEIGHT: 67 IN | TEMPERATURE: 98 F | SYSTOLIC BLOOD PRESSURE: 130 MMHG | DIASTOLIC BLOOD PRESSURE: 82 MMHG | WEIGHT: 196 LBS

## 2024-06-20 DIAGNOSIS — E28.319 ASYMPTOMATIC PREMATURE MENOPAUSE: ICD-10-CM

## 2024-06-20 DIAGNOSIS — I10 HYPERTENSION, UNSPECIFIED TYPE: Primary | ICD-10-CM

## 2024-06-20 DIAGNOSIS — R73.9 HYPERGLYCEMIA: ICD-10-CM

## 2024-06-20 DIAGNOSIS — N83.209 CYST OF OVARY, UNSPECIFIED LATERALITY: ICD-10-CM

## 2024-06-20 PROCEDURE — 1170F FXNL STATUS ASSESSED: CPT | Performed by: INTERNAL MEDICINE

## 2024-06-20 PROCEDURE — 99213 OFFICE O/P EST LOW 20 MIN: CPT | Performed by: INTERNAL MEDICINE

## 2024-06-20 PROCEDURE — 3079F DIAST BP 80-89 MM HG: CPT | Performed by: INTERNAL MEDICINE

## 2024-06-20 PROCEDURE — 1123F ACP DISCUSS/DSCN MKR DOCD: CPT | Performed by: INTERNAL MEDICINE

## 2024-06-20 PROCEDURE — G0439 PPPS, SUBSEQ VISIT: HCPCS | Performed by: INTERNAL MEDICINE

## 2024-06-20 PROCEDURE — 3075F SYST BP GE 130 - 139MM HG: CPT | Performed by: INTERNAL MEDICINE

## 2024-06-20 PROCEDURE — 1036F TOBACCO NON-USER: CPT | Performed by: INTERNAL MEDICINE

## 2024-06-20 PROCEDURE — 1159F MED LIST DOCD IN RCRD: CPT | Performed by: INTERNAL MEDICINE

## 2024-06-20 PROCEDURE — 1158F ADVNC CARE PLAN TLK DOCD: CPT | Performed by: INTERNAL MEDICINE

## 2024-06-20 PROCEDURE — 99397 PER PM REEVAL EST PAT 65+ YR: CPT | Performed by: INTERNAL MEDICINE

## 2024-06-20 PROCEDURE — 1126F AMNT PAIN NOTED NONE PRSNT: CPT | Performed by: INTERNAL MEDICINE

## 2024-06-20 ASSESSMENT — PATIENT HEALTH QUESTIONNAIRE - PHQ9
1. LITTLE INTEREST OR PLEASURE IN DOING THINGS: NOT AT ALL
SUM OF ALL RESPONSES TO PHQ9 QUESTIONS 1 AND 2: 0
2. FEELING DOWN, DEPRESSED OR HOPELESS: NOT AT ALL

## 2024-06-20 ASSESSMENT — LIFESTYLE VARIABLES
AUDIT-C TOTAL SCORE: 0
HOW OFTEN DO YOU HAVE SIX OR MORE DRINKS ON ONE OCCASION: NEVER
HOW OFTEN DO YOU HAVE A DRINK CONTAINING ALCOHOL: NEVER
HOW MANY STANDARD DRINKS CONTAINING ALCOHOL DO YOU HAVE ON A TYPICAL DAY: PATIENT DOES NOT DRINK
SKIP TO QUESTIONS 9-10: 1

## 2024-06-20 ASSESSMENT — ENCOUNTER SYMPTOMS
DEPRESSION: 0
OCCASIONAL FEELINGS OF UNSTEADINESS: 1
LOSS OF SENSATION IN FEET: 1

## 2024-06-20 ASSESSMENT — ACTIVITIES OF DAILY LIVING (ADL)
DOING_HOUSEWORK: INDEPENDENT
DRESSING: INDEPENDENT
TAKING_MEDICATION: INDEPENDENT
BATHING: INDEPENDENT
GROCERY_SHOPPING: INDEPENDENT
MANAGING_FINANCES: INDEPENDENT

## 2024-06-20 ASSESSMENT — PAIN SCALES - GENERAL: PAINLEVEL: 0-NO PAIN

## 2024-06-20 NOTE — PROGRESS NOTES
"Chief Complaint   Patient presents with    Medicare Annual Wellness Visit Subsequent    Follow-up     Pt here for AWV and 6 mo FUV       89 y.o. for AWV and 6 month f/up    Gentile weight. Less active-pulled hip muscle. Less active.  Regarding ovarian cyst decided not to do any further work up-does not want repeat ultrasound (did see gyn but does   Not want any further work up.    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 Ex- passed away. She has 5     children. They are healthy. She is a nonsmoker.     Blood pressure 130/82, pulse 82, temperature 36.7 °C (98 °F), height 1.702 m (5' 7\"), weight 88.9 kg (196 lb), SpO2 95%.  Body mass index is 30.7 kg/m².    Physical Examination  General: NAD. Alert.   HEENT: Normocephalic atraumatic.    Eyes: no scleral icterus. Extraocular movements intact.  Pupils equal round and reactive to light.  Ears: TM intact.  No cerumen. Hearing grossly intact.   Throat: No exudate  Neck:  Supple. No thyroid goiter.  Lymph nodes: No cervical or clavicular adenopathy  Cardiovascular: Regular rate rhythm S1-S2 normal no murmur. No carotid bruit.   Lungs: Clear to Auscultation without wheezing, rales, or rhonchi, Breath sounds symmetric. No use of accessory muscles  Abdomen:  Normoactive bowel sounds, soft, non-tender. No mass. Limited exam  Extremities: No pretibial " "edema  Neuro: no facial asymmetry. No tremor. Babinski negative  Skin: no rash noted  Vascular: DP pulses intact. No cyanosis. Capillary refill 2-3 seconds feet      Labs 11/2023 cmp, cbc  Cr 1.2 albumin low 3.2 glucose 113  Hg 11.9    Lab Results   Component Value Date    HGBA1C 5.8 (A) 07/08/2022     Lab Results   Component Value Date    GLUCOSE 89 09/09/2022    CALCIUM 9.3 09/09/2022     09/09/2022    K 4.5 09/09/2022    CO2 27 09/09/2022     09/09/2022    BUN 23 09/09/2022    CREATININE 1.07 (H) 09/09/2022     Lab Results   Component Value Date    ALT 12 09/09/2022    AST 14 09/09/2022    ALKPHOS 57 09/09/2022    BILITOT 0.4 09/09/2022     Lab Results   Component Value Date    WBC 8.1 09/09/2022    HGB 12.9 09/09/2022    HCT 42.2 09/09/2022    MCV 97 09/09/2022     09/09/2022     Lab Results   Component Value Date    CHOL 152 07/08/2022    CHOL 134 05/24/2021    CHOL 137 06/04/2020     Lab Results   Component Value Date    HDL 40.7 07/08/2022    HDL 43.0 05/24/2021    HDL 43.0 06/04/2020     No results found for: \"LDLCALC\"  Lab Results   Component Value Date    TRIG 135 07/08/2022    TRIG 94 05/24/2021    TRIG 97 06/04/2020     No components found for: \"CHOLHDL\"      ASSESSMENT/PLAN  AWV  Living Will: has a living will, medical POA-son Denilson. (However would want Verónica contacted and wants her changed to medical POA)   Cognition/Memory if 65 or above: 3/3 recall  Hepatitis C (18-79) n/a  HIV (18-64, once) n/a  Women: mammogram: did not recommend  Dexa: ordered  Colon cancer screening 45 and older: did not recommend screen  Immunization: declines  Depression:  denies  1. Hypertension, unspecified type  Well controlled. asymptomatic  - Referral to Nutrition Services; Future    2. Hyperglycemia  - Referral to Nutrition Services; Future    3. Cyst of ovary, unspecified laterality  Does not want any further evaluation    4. Asymptomatic premature menopause  - DEXA - OnBase Scan; " Future            Current Outpatient Medications on File Prior to Visit   Medication Sig Dispense Refill    amLODIPine (Norvasc) 5 mg tablet Take 1 tablet (5 mg) by mouth once daily.      aspirin 81 mg EC tablet Take 1 tablet (81 mg) by mouth once daily.      atorvastatin (Lipitor) 10 mg tablet TAKE 1 TABLET BY MOUTH ONCE  DAILY AT BEDTIME 90 tablet 3    cyanocobalamin (Vitamin B-12) 1,000 mcg tablet Take 1 tablet (1,000 mcg) by mouth once daily.      losartan (Cozaar) 25 mg tablet TAKE 1 TABLET BY MOUTH DAILY 100 tablet 2    multivitamin with minerals iron-free (Centrum Silver) Take 1 tablet by mouth once daily.      nystatin (Mycostatin) cream Apply topically. APPLY SPARINGLY TO AFFECTED AREA(S) 3 TIMES A DAY      rivaroxaban (Xarelto) 20 mg tablet TAKE 1 TABLET BY MOUTH DAILY 100 tablet 1     No current facility-administered medications on file prior to visit.

## 2024-06-28 ENCOUNTER — TREATMENT (OUTPATIENT)
Dept: PHYSICAL THERAPY | Facility: CLINIC | Age: 89
End: 2024-06-28
Payer: COMMERCIAL

## 2024-06-28 ENCOUNTER — LAB (OUTPATIENT)
Dept: LAB | Facility: LAB | Age: 89
End: 2024-06-28
Payer: COMMERCIAL

## 2024-06-28 DIAGNOSIS — M54.40 ACUTE RIGHT-SIDED LOW BACK PAIN WITH SCIATICA, SCIATICA LATERALITY UNSPECIFIED: ICD-10-CM

## 2024-06-28 DIAGNOSIS — R73.9 HYPERGLYCEMIA: ICD-10-CM

## 2024-06-28 DIAGNOSIS — I10 HYPERTENSION, UNSPECIFIED TYPE: ICD-10-CM

## 2024-06-28 DIAGNOSIS — E61.1 LOW IRON: ICD-10-CM

## 2024-06-28 DIAGNOSIS — E55.9 VITAMIN D DEFICIENCY: ICD-10-CM

## 2024-06-28 DIAGNOSIS — M54.41 ACUTE RIGHT-SIDED LOW BACK PAIN WITH RIGHT-SIDED SCIATICA: Primary | ICD-10-CM

## 2024-06-28 DIAGNOSIS — M54.16 LUMBAR NEURITIS: ICD-10-CM

## 2024-06-28 LAB
25(OH)D3 SERPL-MCNC: 48 NG/ML (ref 30–100)
ALBUMIN SERPL BCP-MCNC: 3.9 G/DL (ref 3.4–5)
ALP SERPL-CCNC: 47 U/L (ref 33–136)
ALT SERPL W P-5'-P-CCNC: 17 U/L (ref 7–45)
ANION GAP SERPL CALC-SCNC: 12 MMOL/L (ref 10–20)
AST SERPL W P-5'-P-CCNC: 17 U/L (ref 9–39)
BILIRUB SERPL-MCNC: 0.4 MG/DL (ref 0–1.2)
BUN SERPL-MCNC: 22 MG/DL (ref 6–23)
CALCIUM SERPL-MCNC: 9.6 MG/DL (ref 8.6–10.3)
CHLORIDE SERPL-SCNC: 107 MMOL/L (ref 98–107)
CHOLEST SERPL-MCNC: 151 MG/DL (ref 0–199)
CHOLESTEROL/HDL RATIO: 3.5
CO2 SERPL-SCNC: 27 MMOL/L (ref 21–32)
CREAT SERPL-MCNC: 1.13 MG/DL (ref 0.5–1.05)
EGFRCR SERPLBLD CKD-EPI 2021: 46 ML/MIN/1.73M*2
ERYTHROCYTE [DISTWIDTH] IN BLOOD BY AUTOMATED COUNT: 15 % (ref 11.5–14.5)
EST. AVERAGE GLUCOSE BLD GHB EST-MCNC: 117 MG/DL
FERRITIN SERPL-MCNC: 82 NG/ML (ref 8–150)
GLUCOSE SERPL-MCNC: 102 MG/DL (ref 74–99)
HBA1C MFR BLD: 5.7 %
HCT VFR BLD AUTO: 42.6 % (ref 36–46)
HDLC SERPL-MCNC: 43.6 MG/DL
HGB BLD-MCNC: 13.8 G/DL (ref 12–16)
IRON SATN MFR SERPL: 35 % (ref 25–45)
IRON SERPL-MCNC: 113 UG/DL (ref 35–150)
LDLC SERPL CALC-MCNC: 67 MG/DL
MCH RBC QN AUTO: 31.2 PG (ref 26–34)
MCHC RBC AUTO-ENTMCNC: 32.4 G/DL (ref 32–36)
MCV RBC AUTO: 96 FL (ref 80–100)
NON HDL CHOLESTEROL: 107 MG/DL (ref 0–149)
NRBC BLD-RTO: 0 /100 WBCS (ref 0–0)
PLATELET # BLD AUTO: 224 X10*3/UL (ref 150–450)
POTASSIUM SERPL-SCNC: 4.6 MMOL/L (ref 3.5–5.3)
PROT SERPL-MCNC: 6.4 G/DL (ref 6.4–8.2)
RBC # BLD AUTO: 4.43 X10*6/UL (ref 4–5.2)
SODIUM SERPL-SCNC: 141 MMOL/L (ref 136–145)
TIBC SERPL-MCNC: 322 UG/DL (ref 240–445)
TRIGL SERPL-MCNC: 203 MG/DL (ref 0–149)
UIBC SERPL-MCNC: 209 UG/DL (ref 110–370)
VLDL: 41 MG/DL (ref 0–40)
WBC # BLD AUTO: 7.2 X10*3/UL (ref 4.4–11.3)

## 2024-06-28 PROCEDURE — 85027 COMPLETE CBC AUTOMATED: CPT

## 2024-06-28 PROCEDURE — 83036 HEMOGLOBIN GLYCOSYLATED A1C: CPT

## 2024-06-28 PROCEDURE — 82728 ASSAY OF FERRITIN: CPT

## 2024-06-28 PROCEDURE — 97530 THERAPEUTIC ACTIVITIES: CPT | Mod: GP | Performed by: INTERNAL MEDICINE

## 2024-06-28 PROCEDURE — 83540 ASSAY OF IRON: CPT

## 2024-06-28 PROCEDURE — 83550 IRON BINDING TEST: CPT

## 2024-06-28 PROCEDURE — 82306 VITAMIN D 25 HYDROXY: CPT

## 2024-06-28 PROCEDURE — 80061 LIPID PANEL: CPT

## 2024-06-28 PROCEDURE — 80053 COMPREHEN METABOLIC PANEL: CPT

## 2024-06-28 PROCEDURE — 36415 COLL VENOUS BLD VENIPUNCTURE: CPT

## 2024-06-28 NOTE — PROGRESS NOTES
PHYSICAL THERAPY TREATMENT    Patient Name: Cynthia Bowman  MRN: [id number]  Today's Date: 6/28/2024  Time Calculation  Start Time: 1104  Stop Time: 1145  Time Calculation (min): 41 min    Insurance:  Visit number: 13  Insurance Type: Payor: [employer] DUAL COMPLETE / Plan: [employer] DUAL COMPLETE / Product Type: *No Product type* /   Visits are medically necessary; no auth required after eval  Referred by: Rufus Sotelo MD        PT Therapeutic Procedures Time Entry  Therapeutic Activity Time Entry: 41                 Assessment:  Cynthia Bowman returns to physical therapy after 5 weeks due to increase in LBP with R-sided radicular sx's. She demonstrates increased pain and radicular symptoms since the most recent PT visit. Her impairments include: balance deficits, sensation changes, tenderness, strength deficits, pain, and ROM deficits. Due to these impairments, Cynthia Bowman has the following functional limitations and participation restrictions: Gait deviations, increased fall risk, difficulty stair negotiation, transfers, performing household/recreational/occupational activities, and performing some ADLs. Skilled physical therapy services are appropriate and beneficial in order to achieve measurable and meaningful change in the above objective tests and measures. Utilization of skilled physical therapy services will aid in advancing Cynthia Bowman functional status and attaining her therapy-related goals.    Plan:   - review back school, monitor for directional preference, cont proximal hip and core strengthening, dynamic balance       Therapy Diagnosis:   1. Acute right-sided low back pain with right-sided sciatica  Follow Up In Physical Therapy      2. Acute right-sided low back pain with sciatica, sciatica laterality unspecified  Follow Up In Physical Therapy    Follow Up In Physical Therapy      3. Lumbar neuritis  Follow Up In Physical Therapy    Follow Up In Physical Therapy           Subjective    In to session accompanied by pt friend Verónica. Pt reports increased R hip pain rated 6-7/10 currently at rest; denies radiating now. States she gets radiating pain every day and uses heat and extra strength Tylenol. No activities or positions help decreased radiating pain. No falls or LOB to report.      Precautions:  Fall Risk: Moderate; recommend gait belt and CGA when in clinic  Denies: CA, pacemaker, DM,  latex allergy, epilepsy/seizures, or other known cardio/neuro/pulmonary problems   Denies unexpected weight loss/gain, night sweats, or night pain.    Previous surgeries include: none    Objective:  MODQ: not administered this session     Observation:  Gait: MI with SPC  Posture: fair, forward head, rounded shoulders  Correction of posture:  ne on sx's.    Neurological Findings:  Reflexes:  patellar  (R) unable to elicit (L) unable to elicit;  achilles (R) 1+, (L) unable to elicit    Dermatomes/Sensation Testing:  (L2) Anterior Thigh:  intact  (L3) Medial Knee:  intact  (L4) Medial Malleolus:  intact  (L5) Dorsal Second Toe Web Space: intact  (S1) Lateral Malleolus:  intact    Myotomes/Strength Testing (MMT in sitting):  (L2) Hip Flex (R/L):  4-/5, 4-/5  (L3) Knee Ext (R/L): 5/5, 5/5  Knee Flex (R/L): 4/5 p!, 4+/5  (L4) Ankle DF (R/L): 3+/5, 4+/5  (L5) GTE (R/L): 4/5, 4/5  (S1) Ankle PF (R/L):  5/5, 5/5    Special Tests:  Slump R/L:  NT / NT  Active SLR R/L: NT/ NT    Lumbar ROM/Pretest Symptoms Standing:  FIS: mod limitation with reach right below knees; ne on sx's  RFIS: 10x; ne on sx's   EIS: mod limitation; ne on sx's   TERRIE: 20x; ne on sx's    Trunk SB R/L: NT / NT  Trunk Rotation R/L: NT / NT    Palpation: TTP to R greater trochanter, L3-L5 spinous processes, and R-sided lumbar paraspinals      KEY  NT = not tested this visit  p! = pain  ~ = approximation      Treatments:    Gait Training: NC  - Stairs : reciprocal, 2 HR 1x   -declined gait outdoors due to hot weather  -discussed  activities pt is and is not performing in home and community: pt reports she is doing all activities required of her  - Ambulation w/ stool kicks : 1 laps  -reviewed safety picking objects off floor: pt requires max B UE support on // bar  to get self off floor I.  Recommended pt resume use of home grabber.     Neuromuscular Re-ed:  - Walk in clinic w/ hor, vert head turns 2x ea direction, w/ SPC   - Practice picking up objects with UE reacher/grabber and tossing in to bucket x1 round overground and x2 rounds over gym mat   - LAT and FWD step over Kleenex and shoe boxes using SPC in // bars x6 laps each way    The following were NC this session:   - NBOS vertical and horizontal head turns 10x each side   - 3x6 STS from arm chair on to foam pad with CGA from therapist  - HR/TR (2 finger support) 10xea, no ue's, corrective steps when LOB occurs    Therapeutic Exercise: NC  Access Code: [email or web address]    Therapeutic Activity:  - Completed RA this session. Please see objective and goals sections for more details.  - Spine anatomy education  - Body mechanics education and booklet administered. Emphasis on safe body mechanics when sweeping, doing the dishes, and ironing. Pt reports tenderness with use of lumbar roll.        KEY  NC = not completed this visit   ! = pain  ~ = approximation  // = parallel  RA = re-assessment  NV = next visit      EDUCATION:  - Objective exam findings  - POC update   - Safe body mechanics to manage back pain  - Instructed pt to stop exercise that peripheralize sx's.      Goals:   ST weeks  1. Patient independent with home exercise program to progress current functional status    LTGs: 4 weeks  1.  Improve Oswestry score to 0-19% impairment to indicate a significant improvement in overall lumbar perception of disability.  2.  Decrease complaints of pain by 80% at minimum of evaluation rating, 4 of 7 days a week at minimum.  3. Patient will demonstrate improvements in sitting and standing posture  without cueing from therapist during 45 minute session to allow for improved tolerances for spinal loading.  4.  Patient will demonstrate appropriate and safe body mechanics with work related and/or clinical activities to manage pain and prevent further injury  5.  Increase LE strength by 0.5 to 1 manual testing grade to improve overall functional mobility for IADLS and postural control.    6.  Increase spine AROM to </= mild limitations as appropriate in each plane, to improve functional mobility tolerances for IADLS.

## 2024-07-03 ENCOUNTER — TELEPHONE (OUTPATIENT)
Dept: PRIMARY CARE | Facility: CLINIC | Age: 89
End: 2024-07-03
Payer: COMMERCIAL

## 2024-07-03 NOTE — TELEPHONE ENCOUNTER
----- Message from Khadijah Reddy MD sent at 7/1/2024  5:59 PM EDT -----  Cholesterol very good at 151. Goal 200 or less. Glucose 102. Goal under 100. A1c, test to check average sugar over the last 3 months is 5.7. goal is 5.6 or less. Vitamin D, iron, liver, blood count within goal. Kidney function abnormal but stable from prior

## 2024-07-09 PROBLEM — M54.16 LUMBAR NEURITIS: Status: ACTIVE | Noted: 2024-07-09

## 2024-07-09 NOTE — PROGRESS NOTES
"PHYSICAL THERAPY TREATMENT    Patient Name: Cynthia Bowman  MRN: [id number]  Today's Date: 7/10/2024  Time Calculation  Start Time: 1501  Stop Time: 1540  Time Calculation (min): 39 min    Insurance:  Visit number: 14  Insurance Type: Payor: [employer] DUAL COMPLETE / Plan: [employer] DUAL COMPLETE / Product Type: *No Product type* /   Visits are medically necessary; no auth required after eval  Referred by: Rufus Sotelo MD        PT Therapeutic Procedures Time Entry  Neuromuscular Re-Education Time Entry: 37  Therapeutic Exercise Time Entry: 2                 Assessment:  Able to resume balance activities w/o reproducing LBP nor R LE radicular pain.  Pt MOD challenged w/ dynamic balance using light U UE support.  Intermittent scissoring gait demo w/ attempt to walk w/o UE support, while attempting to take normal size stride w/ heel strike.  Initially, pt tends to demo toe strike but able to correct w/ verbal cuing.  Progressed hip abductor strengthening HEP due to IR of R foot voiced by pt, which is affecting gait.  Horizontal head turns do cause lateral LOB, pt able to self correct when this occurs.    Plan:   Core/hip strength.  Balance/gait.  Treat R LE radicular sxs on PRN basis.      Therapy Diagnosis:   1. Acute right-sided low back pain with right-sided sciatica  Follow Up In Physical Therapy      2. Acute right-sided low back pain with sciatica, sciatica laterality unspecified  Follow Up In Physical Therapy      3. Lumbar neuritis  Follow Up In Physical Therapy          Subjective    In to session accompanied by pt friend Verónica. Pt reports R LE is slowly improving.  Intermittently R lower leg sxs, however, not present at time of appt today, only R buttock.  Pt reports would like to work on \"equilibrium\" today, \"I walk like I'm drunk\"  Pain intensity : reduced to 2-3/10    Precautions:  Fall Risk: Moderate; recommend gait belt and CGA when in clinic  Denies: CA, pacemaker, DM,  latex allergy, " "epilepsy/seizures, or other known cardio/neuro/pulmonary problems   Denies unexpected weight loss/gain, night sweats, or night pain.    Previous surgeries include: none    Objective:    KEY  NT = not tested this visit  p! = pain  ~ = approximation      Treatments:    Neuromuscular Re-ed:  -NBOS EO 30\"x2: min to mod sway laterally  - NBOS vertical and horizontal head turns 10x each side   - Walk in clinic w/ hor, vert head turns 2x ea direction, (SPC, next to wall)  -BWD walk w/ U UE  on counter  - LAT and FWD step over Kleenex and shoe boxes using SPC in // bars x6 laps each way  -wall: walk, taking large steps and \"stay on a straight path\"    NP:  - Practice picking up objects with UE reacher/grabber and tossing in to bucket x1 round overground and x2 rounds over gym mat     Therapeutic Exercise:   Access Code: [email or web address]  Seated hip ABD: issued GTB 7/10 (red too easy)  Supine snow angels: verbally instructed for HEP      KEY  NC = not completed this visit   ! = pain  ~ = approximation  // = parallel  RA = re-assessment  NV = next visit      EDUCATION: ex, gait cues.  UE support for safety.  HEP review/update.          "

## 2024-07-10 ENCOUNTER — TREATMENT (OUTPATIENT)
Dept: PHYSICAL THERAPY | Facility: CLINIC | Age: 89
End: 2024-07-10
Payer: COMMERCIAL

## 2024-07-10 DIAGNOSIS — M54.41 ACUTE RIGHT-SIDED LOW BACK PAIN WITH RIGHT-SIDED SCIATICA: Primary | ICD-10-CM

## 2024-07-10 DIAGNOSIS — M54.40 ACUTE RIGHT-SIDED LOW BACK PAIN WITH SCIATICA, SCIATICA LATERALITY UNSPECIFIED: ICD-10-CM

## 2024-07-10 DIAGNOSIS — M54.16 LUMBAR NEURITIS: ICD-10-CM

## 2024-07-10 PROCEDURE — 97112 NEUROMUSCULAR REEDUCATION: CPT | Mod: GP,CQ

## 2024-07-12 NOTE — PROGRESS NOTES
"PHYSICAL THERAPY TREATMENT    Patient Name: Cynthia Bowman  MRN: [id number]  Today's Date: 7/17/2024  Time Calculation  Start Time: 1445  Stop Time: 1528  Time Calculation (min): 43 min    Insurance:  Visit number: 15  Insurance Type: Payor: [employer] DUAL COMPLETE / Plan: [employer] DUAL COMPLETE / Product Type: *No Product type* /   Visits are medically necessary; no auth required after eval  Referred by: Rufus Sotelo MD        PT Therapeutic Procedures Time Entry  Neuromuscular Re-Education Time Entry: 43                 Assessment:  Significant improvement in gait/dynamic balance today vs previous visits  Reduced stride length largest gait deficit: feel genu valgus due to knee OA w/ + foot pronation a factor  No Lob noted in clinic today, however, mild lateral sway noted w/ walking w/ head turns  Pt does demo MOD to SEVERE difficulty flexing R>L knee enough to step over 18\" TBand from ground to simulate bathtub transfer, Despite B UE support.  Per discussion w/ therapist, pt states she has been thinking of having tub converted to walk in shower for safety.    Plan:   Progress towards discharge next 1-2 visits      Therapy Diagnosis:   1. Acute right-sided low back pain with right-sided sciatica  Follow Up In Physical Therapy      2. Acute right-sided low back pain with sciatica, sciatica laterality unspecified  Follow Up In Physical Therapy      3. Lumbar neuritis  Follow Up In Physical Therapy          Subjective    R buttock pain : no R LE to report.  No falls to report.  Pain intensity : 3-4/10    Precautions:  Fall Risk: Moderate; recommend gait belt and CGA when in clinic  Denies: CA, pacemaker, DM,  latex allergy, epilepsy/seizures, or other known cardio/neuro/pulmonary problems   Denies unexpected weight loss/gain, night sweats, or night pain.    Previous surgeries include: none    Objective:    KEY  NT = not tested this visit  p! = pain  ~ = approximation      Treatments:    Neuromuscular " "Re-ed:  - Walk in clinic w/ hor, vert head turns 2x ea direction, (SPC, next to wall)  - LAT and FWD step over 4\" upside down box next to counter  - walk, taking large steps and \"stay on a straight path\": with cognitive dual task (no math)  -walk w/ stool kicks x 1  -walk w/ ball on tray: straight and with commands for directional changes/stops, etc  -bathtub transfer stepping over TB 18\" from ground in // bars  -walk to car (stairs in lobby)    NP:  - Practice picking up objects with UE reacher/grabber and tossing in to bucket x1 round overground and x2 rounds over gym mat     Therapeutic Exercise:  reviewed only  Access Code: [email or web address]  Seated hip ABD: issued GTB 7/10 (red too easy)  Supine snow angels: verbally instructed for HEP      KEY  NC = not completed this visit   ! = pain  ~ = approximation  // = parallel  RA = re-assessment  NV = next visit      EDUCATION: ex, gait, safety cues.  HEP review/update. Discussed walk in shower w/ pt           "

## 2024-07-17 ENCOUNTER — TREATMENT (OUTPATIENT)
Dept: PHYSICAL THERAPY | Facility: CLINIC | Age: 89
End: 2024-07-17
Payer: COMMERCIAL

## 2024-07-17 DIAGNOSIS — M54.41 ACUTE RIGHT-SIDED LOW BACK PAIN WITH RIGHT-SIDED SCIATICA: Primary | ICD-10-CM

## 2024-07-17 DIAGNOSIS — M54.16 LUMBAR NEURITIS: ICD-10-CM

## 2024-07-17 DIAGNOSIS — M54.40 ACUTE RIGHT-SIDED LOW BACK PAIN WITH SCIATICA, SCIATICA LATERALITY UNSPECIFIED: ICD-10-CM

## 2024-07-17 PROCEDURE — 97112 NEUROMUSCULAR REEDUCATION: CPT | Mod: GP,CQ

## 2024-07-26 ENCOUNTER — TREATMENT (OUTPATIENT)
Dept: PHYSICAL THERAPY | Facility: CLINIC | Age: 89
End: 2024-07-26
Payer: COMMERCIAL

## 2024-07-26 DIAGNOSIS — M54.40 ACUTE RIGHT-SIDED LOW BACK PAIN WITH SCIATICA, SCIATICA LATERALITY UNSPECIFIED: ICD-10-CM

## 2024-07-26 DIAGNOSIS — M54.16 LUMBAR NEURITIS: ICD-10-CM

## 2024-07-26 DIAGNOSIS — M54.41 ACUTE RIGHT-SIDED LOW BACK PAIN WITH RIGHT-SIDED SCIATICA: ICD-10-CM

## 2024-07-26 PROCEDURE — 97530 THERAPEUTIC ACTIVITIES: CPT | Mod: GP | Performed by: INTERNAL MEDICINE

## 2024-07-26 NOTE — PROGRESS NOTES
PHYSICAL THERAPY TREATMENT + RE-ASSESSMENT AND DC NOTE    Patient Name: Cynthia Bowman  MRN: [id number]  Today's Date: 7/26/2024  Time Calculation  Start Time: 1548  Stop Time: 1626  Time Calculation (min): 38 min    Insurance:  Visit number: 16  Insurance Type: Payor: [employer] DUAL COMPLETE / Plan: [employer] DUAL COMPLETE / Product Type: *No Product type* /   Visits are medically necessary; no auth required after eval  Referred by: Rufus Sotelo MD        PT Therapeutic Procedures Time Entry  Therapeutic Activity Time Entry: 38                 Assessment:  Cynthia Bowman has completed 16 sessions of physical therapy treatment with emphasis upon addressing falls and LBP. She demonstrates improving symptoms with less active and resting back pain. Her LE strength 5xSTS, TUG, and 10MWT scores have improved compared to initial evaluation along with her FGA scores.  Currently, Cynthia Bowman has met the majority of all established PT POC goals at this time. Recommend DC with HEP and community resources to practice balance. Cynthia Bowmna voiced agreement and satisfaction with this plan.      Plan:   - DC with HEP and community resources to maintain and practice balance.      Therapy Diagnosis:   1. Acute right-sided low back pain with right-sided sciatica  Follow Up In Physical Therapy      2. Acute right-sided low back pain with sciatica, sciatica laterality unspecified  Follow Up In Physical Therapy      3. Lumbar neuritis  Follow Up In Physical Therapy          Subjective    R buttock pain : no R LE to report.  No falls to report. Pt reports still difficulty with   Pain intensity : 0/10    Precautions:  Fall Risk: Moderate; recommend gait belt and CGA when in clinic  Denies: CA, pacemaker, DM,  latex allergy, epilepsy/seizures, or other known cardio/neuro/pulmonary problems   Denies unexpected weight loss/gain, night sweats, or night pain.    Previous surgeries include: none    Objective   Outcome  "Measures:  Other Measures  Oswestry Disablity Index (YNES): 14% --> not administered this session  ABC scale: 73%     5xSTS: 35.99\" pushing up through legs --> 22.15\" without UE support   TU.15\" with cane on L --> 10.85\"   10MWT (comfortable speed): 0.75 m/s with cane on L --> 1.61 m/s   10MWT (fast speed): 0.89 m/s with cane on L --> 1.62 m/s   FGA:  -->     Observation: compensates with L trunk lean during R hip flexion MMT --> No compensation. Increased B HS cramping noted with LE MMT  Gait: MI with SPC in L UE --> MI with SPC in either hand. Demos fair sri with increased trunk sway due to hip weakness.  Posture: fair, forward head, rounded shoulders --> same  Correction of posture: ne on sx's --> NT     Dermatomes/Sensation Testing:  (L2) Anterior Thigh:  intact --> same  (L3) Medial Knee:  intact --> same  (L4) Medial Malleolus:  intact --> same   (L5) Dorsal Second Toe Web Space: intact --> same   (S1) Lateral Malleolus:  intact --> same     Myotomes/Strength Testing (MMT in sitting):  (L2) Hip Flex (R/L):  4-/5 (with trunk compensation), 4-/5 --> 4/5 , 4/5   (L3) Knee Ext (R/L): 4-/5 (R quad cramping), 4/5 --> 5/5 , 5/5   Knee Flex (R/L): 4-/5, 4/5 --> 5/5 , 5/5   (L4) Ankle DF (R/L): 4-/5, 4/5 --> 4+/5 , 4+/5   (L5) GTE (R/L): 4-/5, 4-/5 --> 4+/5 , 4+/5   (S1) Ankle PF (R/L):  5/5, 5/5 --> 4+/5 , 4+/5      Hip Ext (observed during functional STS without UE support: at least 3+/5 B --> same      Special Tests:  Slump R/L:  neg / neg --> NT / NT      Palpation: no tenderness to lumbar spinous processes, paraspinals, pelvic crests, or glutes B --> NT due to no pain today              KEY  NT = not tested this visit  p! = pain  ~ = approximation    Treatments:  Therapeutic Activity:  - Completed RA this session. Please see objective and goals sections for more details.    Neuromuscular Re-ed: NC  - Walk in clinic w/ hor, vert head turns 2x ea direction, (SPC, next to wall)  - LAT and FWD step " "over 4\" upside down box next to counter  - walk, taking large steps and \"stay on a straight path\": with cognitive dual task (no math)  -walk w/ stool kicks x 1  -walk w/ ball on tray: straight and with commands for directional changes/stops, etc  -bathtub transfer stepping over TB 18\" from ground in // bars  -walk to car (stairs in lobby)    NP:  - Practice picking up objects with UE reacher/grabber and tossing in to bucket x1 round overground and x2 rounds over gym mat     Therapeutic Exercise: NC  Access Code: [email or web address]  Seated hip ABD: issued GTB 7/10 (red too easy)  Supine snow angels: verbally instructed for HEP      KEY  NC = not completed this visit   ! = pain  ~ = approximation  // = parallel  RA = re-assessment  NV = next visit      EDUCATION:  - Objective exam findings  - Resources on community balance programs at [facility] in [place]  - POC update with DC    Goals:     ST weeks:  1. Cynthia Bowman will demonstrate independence and report compliance with HEP to facilitate independent rehab program upon discharge.  - 24: met     LTGs: 6 weeks  1. Cynthia Bowman will perform 5x sit to  < 15 seconds to decrease fall risk.  - 24: partially met     2. Cynthia Bowman will complete TUG within 12 seconds or less (indicative of higher fall risk) in order to INC balance and enhance safety during ADLs/ IADLs. (> or equal to 12 seconds indicates high risk for falls in older adults. 11-20 seconds is normal for the frail and elderly.)   - 24: met     3. Cynthia Bowman will complete all 4 conditions of ModCTSIB for 30 secs with min to no sway to increase safety, decrease fall risk, and improve ability to complete functional activities.  - 24: not administered this session     4. Cynthia Bowman  will increase FGA to >/= 23/30 to decrease fall risk and improve safety/IND at home. The Functional Gait Assessment(FGA) is 10-item test that assesses dynamic balance and postural " stability during gait.  It is used to assessed the following diagnoses: Stroke, Parkinson's Disease, SCI's, Vestibular Disorders, and Geriatrics.  A score of < 22/30 indicates Increased fall risk.   - 07/26/24: partially met     5. Cynthia Bowman will increase 6MWT score by >/= 60.98m (minimal detectible change MDC and 50 meters for minimally clinical important difference MDIC in the geriatric stroke population) to improve ability to perform ADLS, IADLS and improve IND at home.   - 07/26/24: not administered this session     6. Cynthia Bowman  will increase the 10MWT score by >/=0.16m/s (MCID) and > 0.8 m/s which is predictive of community ambulation post stroke. Gait speed on the 10MWT in regards to ambulation classification: </= 0.4 m/s household ambulator; 0.4 m/s-0.8 m/s limited community ambulation; > 0.8 m/s community ambulator)  - 07/26/24: met     7. Cynthia Bowman will complete sit <> stand transfers using BUE, equal weight bearing on LEs, and no major LOB at completion of transfer during 3/3 trials in order to enhance functional mobility and safety.  - 07/26/24: met     8. Cynthia Bowman will ambulate with IND/mod IND using SC on even surfaces for community distances without imbalance or major deviation to safely return to PLOF and enhance access to the community.  - 07/26/24: met     9. Cynthia Bowman will be able to ascend/descend > or equal to 8 steps with/without use of unilateral/bilateral handrail reciprocally without difficulty in order for patient to be able to ambulate safely on all levels of home and in the community.  - 07/26/24: met

## 2024-07-31 ENCOUNTER — APPOINTMENT (OUTPATIENT)
Dept: PHYSICAL THERAPY | Facility: CLINIC | Age: 89
End: 2024-07-31
Payer: COMMERCIAL

## 2024-11-30 DIAGNOSIS — I10 HYPERTENSION, UNSPECIFIED TYPE: ICD-10-CM

## 2024-12-02 ENCOUNTER — APPOINTMENT (OUTPATIENT)
Dept: PRIMARY CARE | Facility: CLINIC | Age: 89
End: 2024-12-02
Payer: COMMERCIAL

## 2024-12-02 RX ORDER — LOSARTAN POTASSIUM 25 MG/1
25 TABLET ORAL DAILY
Qty: 100 TABLET | Refills: 2 | Status: SHIPPED | OUTPATIENT
Start: 2024-12-02

## 2024-12-09 ENCOUNTER — APPOINTMENT (OUTPATIENT)
Dept: PRIMARY CARE | Facility: CLINIC | Age: 89
End: 2024-12-09
Payer: COMMERCIAL

## 2024-12-09 ENCOUNTER — TELEPHONE (OUTPATIENT)
Dept: PRIMARY CARE | Facility: CLINIC | Age: 89
End: 2024-12-09

## 2024-12-09 DIAGNOSIS — E28.319 ASYMPTOMATIC PREMATURE MENOPAUSE: Primary | ICD-10-CM

## 2024-12-09 NOTE — TELEPHONE ENCOUNTER
Pt called and needs new order for bone density so she can schedule.  Order is in as referral and cannot be scheduled.

## 2024-12-19 ENCOUNTER — APPOINTMENT (OUTPATIENT)
Dept: PRIMARY CARE | Facility: CLINIC | Age: 89
End: 2024-12-19
Payer: COMMERCIAL

## 2024-12-20 ENCOUNTER — APPOINTMENT (OUTPATIENT)
Dept: PRIMARY CARE | Facility: CLINIC | Age: 89
End: 2024-12-20
Payer: COMMERCIAL

## 2024-12-20 VITALS
WEIGHT: 192.6 LBS | OXYGEN SATURATION: 94 % | HEART RATE: 84 BPM | HEIGHT: 67 IN | BODY MASS INDEX: 30.23 KG/M2 | TEMPERATURE: 97.3 F | DIASTOLIC BLOOD PRESSURE: 82 MMHG | SYSTOLIC BLOOD PRESSURE: 128 MMHG

## 2024-12-20 DIAGNOSIS — I26.99 OTHER PULMONARY EMBOLISM WITHOUT ACUTE COR PULMONALE, UNSPECIFIED CHRONICITY (MULTI): ICD-10-CM

## 2024-12-20 DIAGNOSIS — N18.31 STAGE 3A CHRONIC KIDNEY DISEASE (MULTI): ICD-10-CM

## 2024-12-20 DIAGNOSIS — R73.9 HYPERGLYCEMIA: Primary | ICD-10-CM

## 2024-12-20 DIAGNOSIS — I65.23 CAROTID STENOSIS, BILATERAL: ICD-10-CM

## 2024-12-20 DIAGNOSIS — I10 HYPERTENSION, UNSPECIFIED TYPE: ICD-10-CM

## 2024-12-20 PROCEDURE — 1036F TOBACCO NON-USER: CPT | Performed by: INTERNAL MEDICINE

## 2024-12-20 PROCEDURE — 99214 OFFICE O/P EST MOD 30 MIN: CPT | Performed by: INTERNAL MEDICINE

## 2024-12-20 PROCEDURE — 1160F RVW MEDS BY RX/DR IN RCRD: CPT | Performed by: INTERNAL MEDICINE

## 2024-12-20 PROCEDURE — 3074F SYST BP LT 130 MM HG: CPT | Performed by: INTERNAL MEDICINE

## 2024-12-20 PROCEDURE — 1159F MED LIST DOCD IN RCRD: CPT | Performed by: INTERNAL MEDICINE

## 2024-12-20 PROCEDURE — 1126F AMNT PAIN NOTED NONE PRSNT: CPT | Performed by: INTERNAL MEDICINE

## 2024-12-20 PROCEDURE — 1123F ACP DISCUSS/DSCN MKR DOCD: CPT | Performed by: INTERNAL MEDICINE

## 2024-12-20 PROCEDURE — 3079F DIAST BP 80-89 MM HG: CPT | Performed by: INTERNAL MEDICINE

## 2024-12-20 PROCEDURE — 1158F ADVNC CARE PLAN TLK DOCD: CPT | Performed by: INTERNAL MEDICINE

## 2024-12-20 RX ORDER — AMLODIPINE BESYLATE 5 MG/1
5 TABLET ORAL DAILY
Qty: 90 TABLET | Refills: 3 | Status: SHIPPED | OUTPATIENT
Start: 2024-12-20

## 2024-12-20 RX ORDER — ATORVASTATIN CALCIUM 10 MG/1
10 TABLET, FILM COATED ORAL NIGHTLY
Qty: 90 TABLET | Refills: 3 | Status: SHIPPED | OUTPATIENT
Start: 2024-12-20

## 2024-12-20 ASSESSMENT — PATIENT HEALTH QUESTIONNAIRE - PHQ9
2. FEELING DOWN, DEPRESSED OR HOPELESS: NOT AT ALL
1. LITTLE INTEREST OR PLEASURE IN DOING THINGS: NOT AT ALL
SUM OF ALL RESPONSES TO PHQ9 QUESTIONS 1 & 2: 0

## 2024-12-20 ASSESSMENT — PAIN SCALES - GENERAL: PAINLEVEL_OUTOF10: 0-NO PAIN

## 2024-12-20 NOTE — PROGRESS NOTES
"Chief Complaint   Patient presents with    Follow-up     Pt here for 6 mo FUV       89 y.o. for  6 month f/up    Regarding ovarian cyst decided not to do any further work up-does not want repeat ultrasound (did see gyn but does   Not want any further work up.    Accompanied by Verónica  No complaints  Patient denies chest pain, pressure, palpitations, or shortness of breath.  Patient notes no abdominal pain  No  complaints. Denies blood in stool or urine.  No fever/chills or night sweats.       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 Ex- passed away. She has 5     children. They are healthy. She is a nonsmoker.     Blood pressure 128/82, pulse 84, temperature 36.3 °C (97.3 °F), height 1.702 m (5' 7\"), weight 87.4 kg (192 lb 9.6 oz), SpO2 94%.  Body mass index is 30.17 kg/m².  Cane.   Physical Examination  General: NAD. Alert.  Eyes. No erythema  Neck:  Supple. No thyroid goiter.  Lymph nodes: No cervical or clavicular adenopathy  Cardiovascular: Regular rate rhythm S1-S2 normal no murmur. No carotid bruit.   Lungs: Clear to Auscultation without wheezing, rales, or rhonchi, Breath sounds symmetric. No use of accessory muscles  Abdomen:  Normoactive bowel sounds, soft, non-tender. No mass. Limited exam  Extremities: No pretibial edema  Neuro: no facial asymmetry.Speech intact  Skin: no " rash noted      Labs 06/2024 D, lipid, cbc, iron, A1c, ferritin, cmp    Lab Results   Component Value Date    HGBA1C 5.7 (H) 06/28/2024     Lab Results   Component Value Date    GLUCOSE 102 (H) 06/28/2024    CALCIUM 9.6 06/28/2024     06/28/2024    K 4.6 06/28/2024    CO2 27 06/28/2024     06/28/2024    BUN 22 06/28/2024    CREATININE 1.13 (H) 06/28/2024     Lab Results   Component Value Date    ALT 17 06/28/2024    AST 17 06/28/2024    ALKPHOS 47 06/28/2024    BILITOT 0.4 06/28/2024     Lab Results   Component Value Date    WBC 7.2 06/28/2024    HGB 13.8 06/28/2024    HCT 42.6 06/28/2024    MCV 96 06/28/2024     06/28/2024     Lab Results   Component Value Date    CHOL 151 06/28/2024    CHOL 152 07/08/2022    CHOL 134 05/24/2021     Lab Results   Component Value Date    HDL 43.6 06/28/2024    HDL 40.7 07/08/2022    HDL 43.0 05/24/2021     Lab Results   Component Value Date    LDLCALC 67 06/28/2024     Lab Results   Component Value Date    TRIG 203 (H) 06/28/2024    TRIG 135 07/08/2022    TRIG 94 05/24/2021     Labs 11/2023 cmp, cbc  Cr 1.2 albumin low 3.2 glucose 113  Hg 11.9    ASSESSMENT/PLAN  1. Hyperglycemia (Primary)  Discussed with pt. Labs now and in 6 months. Ok to eat for labs done now and fast for labs in June  - Hemoglobin A1C; Future  - CBC; Future  - Hemoglobin A1c; Future    2. Hypertension, unspecified type  Well controlled. Asymptomatic.   - Comprehensive Metabolic Panel; Future  - Lipid Panel; Future  - amLODIPine (Norvasc) 5 mg tablet; Take 1 tablet (5 mg) by mouth once daily.  Dispense: 90 tablet; Refill: 3    3. Stage 3a chronic kidney disease (Multi)  Denies nsaid use.   - Comprehensive Metabolic Panel; Future  - Basic metabolic panel; Future  - CBC; Future    4. Other pulmonary embolism without acute cor pulmonale, unspecified chronicity (Multi)  Continue xarelto. Monitor cbc. Denies any s/s bleeding  - rivaroxaban (Xarelto) 20 mg tablet; Take 1 tablet (20 mg) by mouth once  daily.  Dispense: 100 tablet; Refill: 1    5. Carotid stenosis, bilateral  - atorvastatin (Lipitor) 10 mg tablet; Take 1 tablet (10 mg) by mouth once daily at bedtime.  Dispense: 90 tablet; Refill: 3    Living Will: has a living will, medical POA-son Denilson. (However would want Verónica contacted and wants her changed to medical POA)   Women: mammogram: did not recommend  Dexa: ordered  Colon cancer screening 45 and older: did not recommend screen  Immunization: declines          Current Outpatient Medications on File Prior to Visit   Medication Sig Dispense Refill    amLODIPine (Norvasc) 5 mg tablet Take 1 tablet (5 mg) by mouth once daily.      aspirin 81 mg EC tablet Take 1 tablet (81 mg) by mouth once daily.      atorvastatin (Lipitor) 10 mg tablet TAKE 1 TABLET BY MOUTH ONCE  DAILY AT BEDTIME 90 tablet 3    cyanocobalamin (Vitamin B-12) 1,000 mcg tablet Take 1 tablet (1,000 mcg) by mouth once daily.      losartan (Cozaar) 25 mg tablet TAKE 1 TABLET BY MOUTH DAILY 100 tablet 2    multivitamin with minerals iron-free (Centrum Silver) Take 1 tablet by mouth once daily.      rivaroxaban (Xarelto) 20 mg tablet TAKE 1 TABLET BY MOUTH DAILY 100 tablet 1    [DISCONTINUED] nystatin (Mycostatin) cream Apply topically. APPLY SPARINGLY TO AFFECTED AREA(S) 3 TIMES A DAY (Patient not taking: Reported on 12/20/2024)       No current facility-administered medications on file prior to visit.

## 2025-01-02 ENCOUNTER — TELEPHONE (OUTPATIENT)
Dept: PAIN MEDICINE | Facility: CLINIC | Age: OVER 89
End: 2025-01-02
Payer: COMMERCIAL

## 2025-02-01 LAB
ALBUMIN SERPL-MCNC: 3.9 G/DL (ref 3.6–5.1)
ALP SERPL-CCNC: 57 U/L (ref 37–153)
ALT SERPL-CCNC: 15 U/L (ref 6–29)
ANION GAP SERPL CALCULATED.4IONS-SCNC: 12 MMOL/L (CALC) (ref 7–17)
ANION GAP SERPL CALCULATED.4IONS-SCNC: 12 MMOL/L (CALC) (ref 7–17)
AST SERPL-CCNC: 15 U/L (ref 10–35)
BILIRUB SERPL-MCNC: 0.5 MG/DL (ref 0.2–1.2)
BUN SERPL-MCNC: 20 MG/DL (ref 7–25)
BUN SERPL-MCNC: 20 MG/DL (ref 7–25)
BUN/CREAT SERPL: ABNORMAL (CALC) (ref 6–22)
CALCIUM SERPL-MCNC: 9 MG/DL (ref 8.6–10.4)
CALCIUM SERPL-MCNC: 9 MG/DL (ref 8.6–10.4)
CHLORIDE SERPL-SCNC: 105 MMOL/L (ref 98–110)
CHLORIDE SERPL-SCNC: 105 MMOL/L (ref 98–110)
CHOLEST SERPL-MCNC: 155 MG/DL
CHOLEST/HDLC SERPL: 3.4 (CALC)
CO2 SERPL-SCNC: 25 MMOL/L (ref 20–32)
CO2 SERPL-SCNC: 25 MMOL/L (ref 20–32)
CREAT SERPL-MCNC: 0.92 MG/DL (ref 0.6–0.95)
CREAT SERPL-MCNC: 0.92 MG/DL (ref 0.6–0.95)
EGFRCR SERPLBLD CKD-EPI 2021: 59 ML/MIN/1.73M2
EGFRCR SERPLBLD CKD-EPI 2021: 59 ML/MIN/1.73M2
ERYTHROCYTE [DISTWIDTH] IN BLOOD BY AUTOMATED COUNT: 14.1 % (ref 11–15)
EST. AVERAGE GLUCOSE BLD GHB EST-MCNC: 120 MG/DL
EST. AVERAGE GLUCOSE BLD GHB EST-SCNC: 6.6 MMOL/L
GLUCOSE SERPL-MCNC: 104 MG/DL (ref 65–99)
GLUCOSE SERPL-MCNC: 104 MG/DL (ref 65–99)
HBA1C MFR BLD: 5.8 % OF TOTAL HGB
HCT VFR BLD AUTO: 44 % (ref 35–45)
HDLC SERPL-MCNC: 46 MG/DL
HGB BLD-MCNC: 14.1 G/DL (ref 11.7–15.5)
LDLC SERPL CALC-MCNC: 88 MG/DL (CALC)
MCH RBC QN AUTO: 30.6 PG (ref 27–33)
MCHC RBC AUTO-ENTMCNC: 32 G/DL (ref 32–36)
MCV RBC AUTO: 95.4 FL (ref 80–100)
NONHDLC SERPL-MCNC: 109 MG/DL (CALC)
PLATELET # BLD AUTO: 216 THOUSAND/UL (ref 140–400)
PMV BLD REES-ECKER: 9.6 FL (ref 7.5–12.5)
POTASSIUM SERPL-SCNC: 4.1 MMOL/L (ref 3.5–5.3)
POTASSIUM SERPL-SCNC: 4.1 MMOL/L (ref 3.5–5.3)
PROT SERPL-MCNC: 6.5 G/DL (ref 6.1–8.1)
RBC # BLD AUTO: 4.61 MILLION/UL (ref 3.8–5.1)
SODIUM SERPL-SCNC: 142 MMOL/L (ref 135–146)
SODIUM SERPL-SCNC: 142 MMOL/L (ref 135–146)
TRIGL SERPL-MCNC: 118 MG/DL
WBC # BLD AUTO: 7.9 THOUSAND/UL (ref 3.8–10.8)

## 2025-02-05 ENCOUNTER — TELEPHONE (OUTPATIENT)
Dept: PRIMARY CARE | Facility: CLINIC | Age: OVER 89
End: 2025-02-05
Payer: COMMERCIAL

## 2025-02-05 NOTE — TELEPHONE ENCOUNTER
----- Message from Khadijah Reddy sent at 2/4/2025  5:17 PM EST -----  Glucose 104.  Goal under 100.  Hemoglobin A1c, test to check average sugar over the last 3 months is slightly elevated at 5.8.  Goal is 5.6 or less.  Cholesterol is 155.  Goal 200 or less.  Does have a low amount of the good cholesterol so recommend foods such as tuna and/or salmon twice a week to help raise this.  Rest of labs are within goal

## 2025-03-24 DIAGNOSIS — I65.23 CAROTID STENOSIS, BILATERAL: ICD-10-CM

## 2025-03-24 DIAGNOSIS — I10 HYPERTENSION, UNSPECIFIED TYPE: ICD-10-CM

## 2025-03-24 DIAGNOSIS — I26.99 OTHER PULMONARY EMBOLISM WITHOUT ACUTE COR PULMONALE, UNSPECIFIED CHRONICITY (MULTI): ICD-10-CM

## 2025-03-24 RX ORDER — AMLODIPINE BESYLATE 5 MG/1
5 TABLET ORAL DAILY
Qty: 90 TABLET | Refills: 3 | Status: SHIPPED | OUTPATIENT
Start: 2025-03-24

## 2025-03-24 RX ORDER — LOSARTAN POTASSIUM 25 MG/1
25 TABLET ORAL DAILY
Qty: 100 TABLET | Refills: 3 | Status: SHIPPED | OUTPATIENT
Start: 2025-03-24

## 2025-03-24 RX ORDER — ATORVASTATIN CALCIUM 10 MG/1
10 TABLET, FILM COATED ORAL NIGHTLY
Qty: 90 TABLET | Refills: 3 | Status: SHIPPED | OUTPATIENT
Start: 2025-03-24

## 2025-05-21 PROBLEM — H93.13 SUBJECTIVE TINNITUS OF BOTH EARS: Status: ACTIVE | Noted: 2025-05-21

## 2025-06-09 NOTE — PROGRESS NOTES
Gracie Anderson MD P Marenych, N End Nurse Msg Pool  Please c/w methimazole 50 mg daily   PHYSICAL THERAPY TREATMENT    Patient Name: Cynthia Bowman  MRN: [id number]  Today's Date: 4/17/2024  Time Calculation  Start Time: 1106  Stop Time: 1145  Time Calculation (min): 39 min    Insurance:  Visit number: 2  Insurance Type: Payor: [employer] DUAL COMPLETE / Plan: [employer] DUAL COMPLETE / Product Type: *No Product type* /   Visits are medically necessary; no auth required after eval  Referred by: Rufus Sotelo MD        PT Therapeutic Procedures Time Entry  Gait Training Time Entry: 24  Therapeutic Activity Time Entry: 15                     Assessment:  - Focus of session on administering FGA and creating exercises from low scoring items  - FGA results/score indicate pt is at an elevated fall risk with most difficulty during vertical head turns, obstacle step overs, stepping over obstacles, gait with eyes closed and with NBOS and ambulating backwards. Requires Becca to steady during NBOS walking; pt tends to switch cane to R side when activity difficult and she feels more unstable.  - Requires x2 seated rest breaks required throughout session to manage fatigue and MAURO; suspected due to deconditioning; SPO2 and HR WFL to cont session safely.  - No LOB observed throughout session; pt proximal hip and knee weakness lending to instability during gait.    Plan:   NV: monitor compliance with use of cane/rollator to manage fall risk, balance with head turns statically, administer M-CTSIB and 6MWT, assess response to HEP and progress LE strengthening program as jolynn,       Therapy Diagnosis:   1. Acute right-sided low back pain with right-sided sciatica        2. Acute right-sided low back pain with sciatica, sciatica laterality unspecified  Follow Up In Physical Therapy      3. Lumbar neuritis  Follow Up In Physical Therapy        Subjective    In to session with cane  in L hand. Denies falls or adverse events since previous session. Denies LBP at rest. Compliant with HEP 2x/day.    Precautions:   Fall Risk: Moderate; recommend gait belt and CGA when in clinic  Denies: CA, pacemaker, DM,  latex allergy, epilepsy/seizures, or other known cardio/neuro/pulmonary problems   Denies unexpected weight loss/gain, night sweats, or night pain.    Previous surgeries include: none    Objective:  FGA: 10/30    Pt seated rest break during session  SPO2: 95-96%  HR: 81 BPM    Treatments:  Therapeutic Activity:  Administered FGA; please see objective and assessment sections for more details    Gait Training:  - Reciprocal step-overs in // bars with BUE support>>>no UE support; CGA from therapist  - BWD walking in // bars x4 laps; CS  - Trial static tandem and modified tandem standing; unable to complete without UE support or assist from therapist  - NBOS standing; no sway    Therapeutic Exercise: NC  Access Code: [email or web address]  - Sit to Stand with Counter Support  - 1-2 x daily - 7 x weekly - 2 sets - 5-8 reps  - Seated Long Arc Quad  - 1-2 x daily - 7 x weekly - 2-3 sets - 10 reps  - Seated March  - 1-2 x daily - 7 x weekly - 2-3 sets - 10 reps  - Seated Hip Abduction with Resistance  - 1-2 x daily - 7 x weekly - 2-3 sets - 10 reps      KEY  NC = not completed this visit   ! = pain  ~ = approximation  // = parallel  RA = re-assessment  NV = next visit    EDUCATION:  - Cues/rationale for there-ex  - HEP update and hand-out

## 2025-06-16 ENCOUNTER — APPOINTMENT (OUTPATIENT)
Dept: PRIMARY CARE | Facility: CLINIC | Age: OVER 89
End: 2025-06-16
Payer: COMMERCIAL

## 2025-06-16 VITALS
WEIGHT: 197 LBS | HEIGHT: 66 IN | TEMPERATURE: 97.9 F | DIASTOLIC BLOOD PRESSURE: 72 MMHG | OXYGEN SATURATION: 97 % | BODY MASS INDEX: 31.66 KG/M2 | SYSTOLIC BLOOD PRESSURE: 120 MMHG | HEART RATE: 75 BPM

## 2025-06-16 DIAGNOSIS — I10 HYPERTENSION, UNSPECIFIED TYPE: ICD-10-CM

## 2025-06-16 DIAGNOSIS — R73.9 HYPERGLYCEMIA: ICD-10-CM

## 2025-06-16 DIAGNOSIS — E55.9 VITAMIN D DEFICIENCY: ICD-10-CM

## 2025-06-16 DIAGNOSIS — Z00.00 ROUTINE GENERAL MEDICAL EXAMINATION AT HEALTH CARE FACILITY: Primary | ICD-10-CM

## 2025-06-16 RX ORDER — HYDROCHLOROTHIAZIDE 25 MG/1
25 TABLET ORAL DAILY
Qty: 30 TABLET | Refills: 5 | Status: SHIPPED | OUTPATIENT
Start: 2025-06-16 | End: 2025-06-16 | Stop reason: SDUPTHER

## 2025-06-16 RX ORDER — HYDROCHLOROTHIAZIDE 25 MG/1
25 TABLET ORAL DAILY
Qty: 30 TABLET | Refills: 5 | Status: SHIPPED | OUTPATIENT
Start: 2025-06-16 | End: 2025-12-13

## 2025-06-16 ASSESSMENT — LIFESTYLE VARIABLES
SKIP TO QUESTIONS 9-10: 1
HOW OFTEN DO YOU HAVE SIX OR MORE DRINKS ON ONE OCCASION: NEVER
HOW MANY STANDARD DRINKS CONTAINING ALCOHOL DO YOU HAVE ON A TYPICAL DAY: PATIENT DOES NOT DRINK
AUDIT-C TOTAL SCORE: 0
HOW OFTEN DO YOU HAVE A DRINK CONTAINING ALCOHOL: NEVER

## 2025-06-16 ASSESSMENT — ACTIVITIES OF DAILY LIVING (ADL)
GROCERY_SHOPPING: INDEPENDENT
TAKING_MEDICATION: INDEPENDENT
DOING_HOUSEWORK: INDEPENDENT
BATHING: INDEPENDENT
MANAGING_FINANCES: INDEPENDENT
DRESSING: INDEPENDENT

## 2025-06-16 ASSESSMENT — PATIENT HEALTH QUESTIONNAIRE - PHQ9
2. FEELING DOWN, DEPRESSED OR HOPELESS: NOT AT ALL
SUM OF ALL RESPONSES TO PHQ9 QUESTIONS 1 AND 2: 0
1. LITTLE INTEREST OR PLEASURE IN DOING THINGS: NOT AT ALL

## 2025-06-16 ASSESSMENT — PAIN SCALES - GENERAL: PAINLEVEL_OUTOF10: 8

## 2025-06-16 NOTE — PROGRESS NOTES
"Chief Complaint   Patient presents with    Medicare Annual Wellness Visit Subsequent     Pt here for AWV       89 y.o. for AWV  Last visit 12/2024. Verónica present  C/o swelling both legs and feet. States same morning and night.  Eats lunch meat few times a month. Does have some canned foods. Was bad one day so put her legs up.  No cardiopulmonary symptoms.   Saw Dr Gonzalez recently and had labs at     Problem List[1]        Blood pressure 120/72, pulse 75, temperature 36.6 °C (97.9 °F), height 1.664 m (5' 5.5\"), weight 89.4 kg (197 lb), SpO2 97%.  Body mass index is 32.28 kg/m².  Vitals reviewed. Weight stable compared to 2024  Physical Examination  General: NAD. Alert.   HEENT: Normocephalic atraumatic.    Eyes: no scleral icterus. Extraocular movements intact.  Pupils equal round and reactive to light.  Ears: TM intact.  No cerumen. Hearing grossly intact.   Throat: No exudate. +upper and lower dentures.   Neck:  Supple. No thyroid goiter.  Lymph nodes: No cervical or clavicular adenopathy  Cardiovascular: Regular rate rhythm S1-S2 normal no murmur. No carotid bruit.   Lungs: Clear to Auscultation without wheezing, rales, or rhonchi, Breath sounds symmetric. No use of accessory muscles  Abdomen:  Normoactive bowel sounds, soft, non-tender. No mass.  No organomegaly  Extremities: non -pitting  pretibial edema  Neuro: no facial asymmetry. Speech intact.  Babinski negative  Skin: no rash noted  Vascular: DP pulses intact. No cyanosis    Lab Results   Component Value Date    HGBA1C 5.8 (H) 01/31/2025     Lab Results   Component Value Date    GLUCOSE 104 (H) 01/31/2025    GLUCOSE 104 (H) 01/31/2025    CALCIUM 9.0 01/31/2025    CALCIUM 9.0 01/31/2025     01/31/2025     01/31/2025    K 4.1 01/31/2025    K 4.1 01/31/2025    CO2 25 01/31/2025    CO2 25 01/31/2025     01/31/2025     01/31/2025    BUN 20 01/31/2025    BUN 20 01/31/2025    CREATININE 0.92 01/31/2025    CREATININE 0.92 01/31/2025 " "    Lab Results   Component Value Date    ALT 15 01/31/2025    AST 15 01/31/2025    ALKPHOS 57 01/31/2025    BILITOT 0.5 01/31/2025     Lab Results   Component Value Date    WBC 7.9 01/31/2025    HGB 14.1 01/31/2025    HCT 44.0 01/31/2025    MCV 95.4 01/31/2025     01/31/2025     Lab Results   Component Value Date    CHOL 155 01/31/2025    CHOL 151 06/28/2024    CHOL 152 07/08/2022     Lab Results   Component Value Date    HDL 46 (L) 01/31/2025    HDL 43.6 06/28/2024    HDL 40.7 07/08/2022     Lab Results   Component Value Date    LDLCALC 88 01/31/2025    LDLCALC 67 06/28/2024     Lab Results   Component Value Date    TRIG 118 01/31/2025    TRIG 203 (H) 06/28/2024    TRIG 135 07/08/2022     No components found for: \"CHOLHDL\"      ASSESSMENT/PLAN  AWV  Living Will: has a living will.   Cognition/Memory if 65 or above: 3/3 normal clock.   Hepatitis C screen (18-79) n/a  HIV screen(18-64, once) n/a  Mammogram: did not recommend  Pap: n/a  Dexa: previously ordered, not done.   Colon cancer screening 45 and older: did not recommend  Immunization: declines   Depression screen:  denies.   CT calcium score:  n/a    If Smoker/Former smoker:    Age 50-75, 20 pack year history, quit within 15 years or currently smoking  (medicare 55-77, 30 pack year) n/a    1. Hyperglycemia  - Comprehensive Metabolic Panel; Future  - CBC; Future  - Hemoglobin A1C; Future    2. Hypertension, unspecified type  Within goal but c/o swelling  Discontinue amlodipine.   Rx hydrochlorothiazide  Continue losartan  Bmp in 1-2 weeks  F/up 6 weeks to evaluate response.   Medication use discussed with patient including potential benefits and side effects. Patient verbalized understanding and agreed to proceed.   - Lipid Panel; Future  - Comprehensive Metabolic Panel; Future  - CBC; Future  - Basic metabolic panel; Future  - hydroCHLOROthiazide (HYDRODiuril) 25 mg tablet; Take 1 tablet (25 mg) by mouth once daily.  Dispense: 30 tablet; Refill: " 5    3. Vitamin D deficiency  - Vitamin D 25-Hydroxy,Total (for eval of Vitamin D levels); Future    4. 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 90 tablet 3    aspirin 81 mg EC tablet Take 1 tablet (81 mg) by mouth once daily.      atorvastatin (Lipitor) 10 mg tablet Take 1 tablet (10 mg) by mouth once daily at bedtime. 90 tablet 3    cyanocobalamin (Vitamin B-12) 1,000 mcg tablet Take 1 tablet (1,000 mcg) by mouth once daily.      losartan (Cozaar) 25 mg tablet Take 1 tablet (25 mg) by mouth once daily. 100 tablet 3    multivitamin with minerals iron-free (Centrum Silver) Take 1 tablet by mouth once daily.      rivaroxaban (Xarelto) 20 mg tablet Take 1 tablet (20 mg) by mouth once daily. 100 tablet 1     No current facility-administered medications on file prior to visit.

## 2025-06-17 RX ORDER — HYDROCHLOROTHIAZIDE 25 MG/1
25 TABLET ORAL DAILY
Qty: 90 TABLET | OUTPATIENT
Start: 2025-06-17

## 2025-06-23 DIAGNOSIS — I10 HYPERTENSION, UNSPECIFIED TYPE: ICD-10-CM

## 2025-06-27 LAB
ANION GAP SERPL CALCULATED.4IONS-SCNC: 12 MMOL/L (CALC) (ref 7–17)
BUN SERPL-MCNC: 26 MG/DL (ref 7–25)
BUN/CREAT SERPL: 27 (CALC) (ref 6–22)
CALCIUM SERPL-MCNC: 9.4 MG/DL (ref 8.6–10.4)
CHLORIDE SERPL-SCNC: 102 MMOL/L (ref 98–110)
CO2 SERPL-SCNC: 27 MMOL/L (ref 20–32)
CREAT SERPL-MCNC: 0.95 MG/DL (ref 0.6–0.95)
EGFRCR SERPLBLD CKD-EPI 2021: 57 ML/MIN/1.73M2
GLUCOSE SERPL-MCNC: 100 MG/DL (ref 65–99)
POTASSIUM SERPL-SCNC: 4.2 MMOL/L (ref 3.5–5.3)
SODIUM SERPL-SCNC: 141 MMOL/L (ref 135–146)

## 2025-06-30 ENCOUNTER — TELEPHONE (OUTPATIENT)
Dept: PRIMARY CARE | Facility: CLINIC | Age: OVER 89
End: 2025-06-30
Payer: COMMERCIAL

## 2025-06-30 NOTE — TELEPHONE ENCOUNTER
----- Message from Khadijah Reddy sent at 6/27/2025  3:25 PM EDT -----  Sodium, potassium, and kidney function are stable.  Please continue current medication.  ----- Message -----  From: [facility] Results In  Sent: 6/27/2025   6:08 AM EDT  To: Khadijah Reddy MD

## 2025-08-12 ENCOUNTER — APPOINTMENT (OUTPATIENT)
Dept: PRIMARY CARE | Facility: CLINIC | Age: OVER 89
End: 2025-08-12
Payer: COMMERCIAL